# Patient Record
Sex: FEMALE | Race: WHITE | NOT HISPANIC OR LATINO | Employment: FULL TIME | URBAN - NONMETROPOLITAN AREA
[De-identification: names, ages, dates, MRNs, and addresses within clinical notes are randomized per-mention and may not be internally consistent; named-entity substitution may affect disease eponyms.]

---

## 2017-04-26 ENCOUNTER — TRANSCRIBE ORDERS (OUTPATIENT)
Dept: ADMINISTRATIVE | Facility: HOSPITAL | Age: 42
End: 2017-04-26

## 2017-04-26 DIAGNOSIS — R10.2 ADNEXAL TENDERNESS: Primary | ICD-10-CM

## 2017-04-26 DIAGNOSIS — Z12.31 VISIT FOR SCREENING MAMMOGRAM: Primary | ICD-10-CM

## 2017-05-01 ENCOUNTER — HOSPITAL ENCOUNTER (OUTPATIENT)
Dept: MAMMOGRAPHY | Facility: HOSPITAL | Age: 42
Discharge: HOME | End: 2017-05-01
Admitting: NURSE PRACTITIONER

## 2017-05-01 ENCOUNTER — HOSPITAL ENCOUNTER (OUTPATIENT)
Dept: ULTRASOUND IMAGING | Facility: HOSPITAL | Age: 42
Discharge: HOME | End: 2017-05-01

## 2017-05-01 DIAGNOSIS — Z12.31 VISIT FOR SCREENING MAMMOGRAM: ICD-10-CM

## 2017-05-01 DIAGNOSIS — R10.2 ADNEXAL TENDERNESS: ICD-10-CM

## 2017-05-01 PROCEDURE — G0202 SCR MAMMO BI INCL CAD: HCPCS

## 2017-05-01 PROCEDURE — 76830 TRANSVAGINAL US NON-OB: CPT

## 2017-05-01 PROCEDURE — 77063 BREAST TOMOSYNTHESIS BI: CPT | Performed by: RADIOLOGY

## 2017-05-01 PROCEDURE — 76830 TRANSVAGINAL US NON-OB: CPT | Performed by: RADIOLOGY

## 2017-05-01 PROCEDURE — 77067 SCR MAMMO BI INCL CAD: CPT | Performed by: RADIOLOGY

## 2017-05-01 PROCEDURE — 77063 BREAST TOMOSYNTHESIS BI: CPT

## 2017-09-05 ENCOUNTER — TRANSCRIBE ORDERS (OUTPATIENT)
Dept: ADMINISTRATIVE | Facility: HOSPITAL | Age: 42
End: 2017-09-05

## 2017-09-05 ENCOUNTER — APPOINTMENT (OUTPATIENT)
Dept: LAB | Facility: HOSPITAL | Age: 42
End: 2017-09-05

## 2017-09-05 DIAGNOSIS — R10.31 ABDOMINAL PAIN, RIGHT LOWER QUADRANT: Primary | ICD-10-CM

## 2017-09-05 LAB
ALBUMIN SERPL-MCNC: 3.9 G/DL (ref 3.5–5)
ALBUMIN/GLOB SERPL: 1.3 G/DL (ref 1.5–2.5)
ALP SERPL-CCNC: 73 U/L (ref 35–104)
ALT SERPL W P-5'-P-CCNC: 36 U/L (ref 10–36)
ANION GAP SERPL CALCULATED.3IONS-SCNC: 2 MMOL/L (ref 3.6–11.2)
AST SERPL-CCNC: 27 U/L (ref 10–30)
BASOPHILS # BLD AUTO: 0.03 10*3/MM3 (ref 0–0.3)
BASOPHILS NFR BLD AUTO: 0.4 % (ref 0–2)
BILIRUB SERPL-MCNC: 0.3 MG/DL (ref 0.2–1.8)
BUN BLD-MCNC: 12 MG/DL (ref 7–21)
BUN/CREAT SERPL: 12.8 (ref 7–25)
CALCIUM SPEC-SCNC: 9.1 MG/DL (ref 7.7–10)
CHLORIDE SERPL-SCNC: 106 MMOL/L (ref 99–112)
CHOLEST SERPL-MCNC: 181 MG/DL (ref 0–200)
CO2 SERPL-SCNC: 32 MMOL/L (ref 24.3–31.9)
CREAT BLD-MCNC: 0.94 MG/DL (ref 0.43–1.29)
DEPRECATED RDW RBC AUTO: 43.1 FL (ref 37–54)
EOSINOPHIL # BLD AUTO: 0.37 10*3/MM3 (ref 0–0.7)
EOSINOPHIL NFR BLD AUTO: 5.3 % (ref 0–5)
ERYTHROCYTE [DISTWIDTH] IN BLOOD BY AUTOMATED COUNT: 13.4 % (ref 11.5–14.5)
GFR SERPL CREATININE-BSD FRML MDRD: 65 ML/MIN/1.73
GLOBULIN UR ELPH-MCNC: 2.9 GM/DL
GLUCOSE BLD-MCNC: 86 MG/DL (ref 70–110)
HCT VFR BLD AUTO: 41.8 % (ref 37–47)
HDLC SERPL-MCNC: 56 MG/DL (ref 60–100)
HGB BLD-MCNC: 13.8 G/DL (ref 12–16)
IMM GRANULOCYTES # BLD: 0.01 10*3/MM3 (ref 0–0.03)
IMM GRANULOCYTES NFR BLD: 0.1 % (ref 0–0.5)
LDLC SERPL CALC-MCNC: 90 MG/DL (ref 0–100)
LDLC/HDLC SERPL: 1.61 {RATIO}
LYMPHOCYTES # BLD AUTO: 2.71 10*3/MM3 (ref 1–3)
LYMPHOCYTES NFR BLD AUTO: 38.7 % (ref 21–51)
MCH RBC QN AUTO: 29.6 PG (ref 27–33)
MCHC RBC AUTO-ENTMCNC: 33 G/DL (ref 33–37)
MCV RBC AUTO: 89.5 FL (ref 80–94)
MONOCYTES # BLD AUTO: 0.45 10*3/MM3 (ref 0.1–0.9)
MONOCYTES NFR BLD AUTO: 6.4 % (ref 0–10)
NEUTROPHILS # BLD AUTO: 3.43 10*3/MM3 (ref 1.4–6.5)
NEUTROPHILS NFR BLD AUTO: 49.1 % (ref 30–70)
OSMOLALITY SERPL CALC.SUM OF ELEC: 278.5 MOSM/KG (ref 273–305)
PLATELET # BLD AUTO: 258 10*3/MM3 (ref 130–400)
PMV BLD AUTO: 11.3 FL (ref 6–10)
POTASSIUM BLD-SCNC: 4 MMOL/L (ref 3.5–5.3)
PROT SERPL-MCNC: 6.8 G/DL (ref 6–8)
RBC # BLD AUTO: 4.67 10*6/MM3 (ref 4.2–5.4)
SODIUM BLD-SCNC: 140 MMOL/L (ref 135–153)
TRIGL SERPL-MCNC: 173 MG/DL (ref 0–150)
VLDLC SERPL-MCNC: 34.6 MG/DL
WBC NRBC COR # BLD: 7 10*3/MM3 (ref 4.5–12.5)

## 2017-09-05 PROCEDURE — 80061 LIPID PANEL: CPT | Performed by: NURSE PRACTITIONER

## 2017-09-05 PROCEDURE — 36415 COLL VENOUS BLD VENIPUNCTURE: CPT | Performed by: NURSE PRACTITIONER

## 2017-09-05 PROCEDURE — 85025 COMPLETE CBC W/AUTO DIFF WBC: CPT | Performed by: NURSE PRACTITIONER

## 2017-09-05 PROCEDURE — 80053 COMPREHEN METABOLIC PANEL: CPT | Performed by: NURSE PRACTITIONER

## 2018-05-23 ENCOUNTER — HOSPITAL ENCOUNTER (OUTPATIENT)
Dept: MAMMOGRAPHY | Facility: HOSPITAL | Age: 43
Discharge: HOME | End: 2018-05-23
Admitting: NURSE PRACTITIONER

## 2018-05-23 DIAGNOSIS — Z12.39 BREAST CANCER SCREENING: ICD-10-CM

## 2018-05-23 PROCEDURE — 77063 BREAST TOMOSYNTHESIS BI: CPT

## 2018-05-23 PROCEDURE — 77063 BREAST TOMOSYNTHESIS BI: CPT | Performed by: RADIOLOGY

## 2018-05-23 PROCEDURE — 77067 SCR MAMMO BI INCL CAD: CPT

## 2018-05-23 PROCEDURE — 77067 SCR MAMMO BI INCL CAD: CPT | Performed by: RADIOLOGY

## 2019-01-25 ENCOUNTER — TRANSCRIBE ORDERS (OUTPATIENT)
Dept: ADMINISTRATIVE | Facility: HOSPITAL | Age: 44
End: 2019-01-25

## 2019-01-25 DIAGNOSIS — J32.4 CHRONIC PANSINUSITIS: Primary | ICD-10-CM

## 2019-02-04 ENCOUNTER — HOSPITAL ENCOUNTER (OUTPATIENT)
Dept: CT IMAGING | Facility: HOSPITAL | Age: 44
Discharge: HOME | End: 2019-02-04
Admitting: NURSE PRACTITIONER

## 2019-02-04 DIAGNOSIS — J32.4 CHRONIC PANSINUSITIS: ICD-10-CM

## 2019-02-04 PROCEDURE — 70486 CT MAXILLOFACIAL W/O DYE: CPT

## 2019-02-04 PROCEDURE — 70486 CT MAXILLOFACIAL W/O DYE: CPT | Performed by: RADIOLOGY

## 2019-02-05 PROBLEM — J34.3 HYPERTROPHY OF NASAL TURBINATES: Status: ACTIVE | Noted: 2019-02-05

## 2019-02-05 PROBLEM — J30.1 ALLERGIC RHINITIS DUE TO POLLEN: Status: ACTIVE | Noted: 2019-02-05

## 2019-02-05 PROBLEM — J34.2 DEVIATED NASAL SEPTUM: Status: ACTIVE | Noted: 2019-02-05

## 2019-03-06 ENCOUNTER — APPOINTMENT (OUTPATIENT)
Dept: PREADMISSION TESTING | Facility: HOSPITAL | Age: 44
End: 2019-03-06

## 2019-03-06 LAB
ANION GAP SERPL CALCULATED.3IONS-SCNC: 8.5 MMOL/L (ref 3.6–11.2)
BUN BLD-MCNC: 16 MG/DL (ref 7–21)
BUN/CREAT SERPL: 14.7 (ref 7–25)
CALCIUM SPEC-SCNC: 9.2 MG/DL (ref 7.7–10)
CHLORIDE SERPL-SCNC: 104 MMOL/L (ref 99–112)
CO2 SERPL-SCNC: 25.5 MMOL/L (ref 24.3–31.9)
CREAT BLD-MCNC: 1.09 MG/DL (ref 0.43–1.29)
DEPRECATED RDW RBC AUTO: 41.2 FL (ref 37–54)
ERYTHROCYTE [DISTWIDTH] IN BLOOD BY AUTOMATED COUNT: 12.6 % (ref 11.5–14.5)
GFR SERPL CREATININE-BSD FRML MDRD: 54 ML/MIN/1.73
GLUCOSE BLD-MCNC: 84 MG/DL (ref 70–110)
HCT VFR BLD AUTO: 43.9 % (ref 37–47)
HGB BLD-MCNC: 14.4 G/DL (ref 12–16)
MCH RBC QN AUTO: 29.7 PG (ref 27–33)
MCHC RBC AUTO-ENTMCNC: 32.8 G/DL (ref 33–37)
MCV RBC AUTO: 90.5 FL (ref 80–94)
OSMOLALITY SERPL CALC.SUM OF ELEC: 276.1 MOSM/KG (ref 273–305)
PLATELET # BLD AUTO: 283 10*3/MM3 (ref 130–400)
PMV BLD AUTO: 11 FL (ref 6–10)
POTASSIUM BLD-SCNC: 4.2 MMOL/L (ref 3.5–5.3)
RBC # BLD AUTO: 4.85 10*6/MM3 (ref 4.2–5.4)
SODIUM BLD-SCNC: 138 MMOL/L (ref 135–153)
WBC NRBC COR # BLD: 9.72 10*3/MM3 (ref 4.5–12.5)

## 2019-03-06 PROCEDURE — 85027 COMPLETE CBC AUTOMATED: CPT | Performed by: ANESTHESIOLOGY

## 2019-03-06 PROCEDURE — 80048 BASIC METABOLIC PNL TOTAL CA: CPT | Performed by: ANESTHESIOLOGY

## 2019-03-06 PROCEDURE — 36415 COLL VENOUS BLD VENIPUNCTURE: CPT

## 2019-03-06 NOTE — DISCHARGE INSTRUCTIONS
0730---3/11/19        ARRIVAL TIME    TAKE the following medications the morning of surgery:  All heart or blood pressure medications    HOLD all diabetic medications the morning of surgery as ordered by physician.    Please discontinue all blood thinners and anticoagulants (except aspirin) prior to surgery as per your surgeon and cardiologist instructions.  Aspirin may be continued up to the day prior to surgery.     CHLORHEXIDINE CLOTHS GIVEN WITH INSTRUCTIONS AND FORM TO RETURN TO HOSPITAL    General Instructions:  · Do not eat or drink after midnight: includes water, mints, or gum. You may brush your teeth.  Dental appliances that are removable must be taken out day of surgery.  · Do not smoke, chew tobacco, or drink alcohol.  · Bring medications in original bottles, any inhalers and if applicable your C-PAP/BI-PAP machine.  · Bring any papers given to you in the doctor's office.  · Wear clean comfortable clothes and socks.  · Do not wear contact lenses or make-up. Bring a case for your glasses if applicable.  · Bring crutches or walker if applicable.  · Leave all other valuables and jewelry at home.    If you were given a blood bank ID arm band remember to bring it with you the day of surgery.    Preventing a Surgical Site Infection:  Shower the night before surgery (unless instructed other wise) using a fresh bar of anti-bacterial soap (such as Dial) and clean washcloth. Dry with a clean towel and dress in clean clothing.  For 2 to 3 days before surgery, avoid shaving with a razor near where you will have surgery because the razor can irritate skin and make it easier to develop an infection. Ask your surgeon if you will be receiving antibiotics prior to surgery.  Make sure you, your family, and all healthcare providers clear their hands with soap and water or an alcohol-based hand  before caring for you or your wound.  If at all possible, quit smoking as many days before surgery as you can.    Day of  surgery:  Upon arrival, a Pre-op nurse and Anesthesiologist will review your health history, obtain vital signs, and answer questions you may have. The only belongings needed at this time will be your home medications and if applicable your C-PAP/BI-PAP machine. If you are staying overnight your family can leave the rest of your belongings in the car and bring them to your room later. A Pre-op nurse will start an IV and you may receive medication in preparation for surgery, including something to help you relax. Your family will be able to see you in the Pre-op area. While you are in surgery your family should notify the waiting room  if they leave the waiting room area and provide a contact phone number.    Please be aware that surgery does come with discomfort. We want to make every effort to control your discomfort so please discuss any uncontrolled symptoms with your nurse. Your doctor will most likely have prescribed pain medications.    If you are going home after surgery you will receive individualized written care instructions before being discharged. A responsible adult must drive you to and from the hospital on the day of surgery and stay with you for 24 hours.    If you are staying overnight following surgery, you will be transported to your hospital room following the recovery period.  [facility] has all private rooms.    If you have any questions please call Pre-Admission Testing at [phone].  Deductibles and co-payments are collected on the day of service. Please be prepared to pay the required co-pay, deductible or deposit on the day of service as defined by your plan.

## 2019-03-11 ENCOUNTER — ANESTHESIA EVENT (OUTPATIENT)
Dept: PERIOP | Facility: HOSPITAL | Age: 44
End: 2019-03-11

## 2019-03-11 ENCOUNTER — HOSPITAL ENCOUNTER (OUTPATIENT)
Facility: HOSPITAL | Age: 44
Setting detail: HOSPITAL OUTPATIENT SURGERY
Discharge: HOME | End: 2019-03-11
Attending: OTOLARYNGOLOGY | Admitting: OTOLARYNGOLOGY

## 2019-03-11 ENCOUNTER — ANESTHESIA (OUTPATIENT)
Dept: PERIOP | Facility: HOSPITAL | Age: 44
End: 2019-03-11

## 2019-03-11 VITALS
DIASTOLIC BLOOD PRESSURE: 79 MMHG | BODY MASS INDEX: 40.85 KG/M2 | SYSTOLIC BLOOD PRESSURE: 132 MMHG | HEART RATE: 72 BPM | TEMPERATURE: 97.6 F | WEIGHT: 222 LBS | RESPIRATION RATE: 18 BRPM | HEIGHT: 62 IN | OXYGEN SATURATION: 97 %

## 2019-03-11 DIAGNOSIS — J34.3 HYPERTROPHY OF NASAL TURBINATES: ICD-10-CM

## 2019-03-11 DIAGNOSIS — J30.1 ALLERGIC RHINITIS DUE TO POLLEN: ICD-10-CM

## 2019-03-11 DIAGNOSIS — J34.2 DEVIATED NASAL SEPTUM: ICD-10-CM

## 2019-03-11 LAB
B-HCG UR QL: NEGATIVE
INTERNAL NEGATIVE CONTROL: NEGATIVE
INTERNAL POSITIVE CONTROL: POSITIVE
Lab: NORMAL

## 2019-03-11 PROCEDURE — 25010000002 NEOSTIGMINE 10 MG/10ML SOLUTION: Performed by: NURSE ANESTHETIST, CERTIFIED REGISTERED

## 2019-03-11 PROCEDURE — 81025 URINE PREGNANCY TEST: CPT | Performed by: OTOLARYNGOLOGY

## 2019-03-11 PROCEDURE — 25010000002 FENTANYL CITRATE (PF) 100 MCG/2ML SOLUTION: Performed by: NURSE ANESTHETIST, CERTIFIED REGISTERED

## 2019-03-11 PROCEDURE — 25010000002 MIDAZOLAM PER 1 MG: Performed by: NURSE ANESTHETIST, CERTIFIED REGISTERED

## 2019-03-11 PROCEDURE — 25010000002 ONDANSETRON PER 1 MG: Performed by: NURSE ANESTHETIST, CERTIFIED REGISTERED

## 2019-03-11 PROCEDURE — 25010000002 PROPOFOL 10 MG/ML EMULSION: Performed by: NURSE ANESTHETIST, CERTIFIED REGISTERED

## 2019-03-11 PROCEDURE — 25010000002 DEXAMETHASONE PER 1 MG: Performed by: NURSE ANESTHETIST, CERTIFIED REGISTERED

## 2019-03-11 RX ORDER — LIDOCAINE HYDROCHLORIDE AND EPINEPHRINE 10; 10 MG/ML; UG/ML
INJECTION, SOLUTION INFILTRATION; PERINEURAL AS NEEDED
Status: DISCONTINUED | OUTPATIENT
Start: 2019-03-11 | End: 2019-03-11 | Stop reason: HOSPADM

## 2019-03-11 RX ORDER — ONDANSETRON 4 MG/1
4 TABLET, FILM COATED ORAL EVERY 8 HOURS PRN
Qty: 12 TABLET | Refills: 0 | Status: SHIPPED | OUTPATIENT
Start: 2019-03-11 | End: 2022-09-08

## 2019-03-11 RX ORDER — FENTANYL CITRATE 50 UG/ML
50 INJECTION, SOLUTION INTRAMUSCULAR; INTRAVENOUS
Status: DISCONTINUED | OUTPATIENT
Start: 2019-03-11 | End: 2019-03-11 | Stop reason: HOSPADM

## 2019-03-11 RX ORDER — GLYCOPYRROLATE 0.2 MG/ML
INJECTION INTRAMUSCULAR; INTRAVENOUS AS NEEDED
Status: DISCONTINUED | OUTPATIENT
Start: 2019-03-11 | End: 2019-03-11 | Stop reason: SURG

## 2019-03-11 RX ORDER — NEOSTIGMINE METHYLSULFATE 1 MG/ML
INJECTION, SOLUTION INTRAVENOUS AS NEEDED
Status: DISCONTINUED | OUTPATIENT
Start: 2019-03-11 | End: 2019-03-11 | Stop reason: SURG

## 2019-03-11 RX ORDER — SODIUM CHLORIDE 0.9 % (FLUSH) 0.9 %
3 SYRINGE (ML) INJECTION EVERY 12 HOURS SCHEDULED
Status: DISCONTINUED | OUTPATIENT
Start: 2019-03-11 | End: 2019-03-11 | Stop reason: HOSPADM

## 2019-03-11 RX ORDER — MEPERIDINE HYDROCHLORIDE 25 MG/ML
12.5 INJECTION INTRAMUSCULAR; INTRAVENOUS; SUBCUTANEOUS
Status: DISCONTINUED | OUTPATIENT
Start: 2019-03-11 | End: 2019-03-11 | Stop reason: HOSPADM

## 2019-03-11 RX ORDER — IPRATROPIUM BROMIDE AND ALBUTEROL SULFATE 2.5; .5 MG/3ML; MG/3ML
3 SOLUTION RESPIRATORY (INHALATION) ONCE AS NEEDED
Status: DISCONTINUED | OUTPATIENT
Start: 2019-03-11 | End: 2019-03-11 | Stop reason: HOSPADM

## 2019-03-11 RX ORDER — ONDANSETRON 2 MG/ML
INJECTION INTRAMUSCULAR; INTRAVENOUS AS NEEDED
Status: DISCONTINUED | OUTPATIENT
Start: 2019-03-11 | End: 2019-03-11 | Stop reason: SURG

## 2019-03-11 RX ORDER — BACITRACIN, NEOMYCIN, POLYMYXIN B 400; 3.5; 5 [USP'U]/G; MG/G; [USP'U]/G
OINTMENT TOPICAL AS NEEDED
Status: DISCONTINUED | OUTPATIENT
Start: 2019-03-11 | End: 2019-03-11 | Stop reason: HOSPADM

## 2019-03-11 RX ORDER — ROCURONIUM BROMIDE 10 MG/ML
INJECTION, SOLUTION INTRAVENOUS AS NEEDED
Status: DISCONTINUED | OUTPATIENT
Start: 2019-03-11 | End: 2019-03-11 | Stop reason: SURG

## 2019-03-11 RX ORDER — PROPOFOL 10 MG/ML
VIAL (ML) INTRAVENOUS AS NEEDED
Status: DISCONTINUED | OUTPATIENT
Start: 2019-03-11 | End: 2019-03-11 | Stop reason: SURG

## 2019-03-11 RX ORDER — SODIUM CHLORIDE 0.9 % (FLUSH) 0.9 %
3-10 SYRINGE (ML) INJECTION AS NEEDED
Status: DISCONTINUED | OUTPATIENT
Start: 2019-03-11 | End: 2019-03-11 | Stop reason: HOSPADM

## 2019-03-11 RX ORDER — MIDAZOLAM HYDROCHLORIDE 1 MG/ML
INJECTION INTRAMUSCULAR; INTRAVENOUS AS NEEDED
Status: DISCONTINUED | OUTPATIENT
Start: 2019-03-11 | End: 2019-03-11 | Stop reason: SURG

## 2019-03-11 RX ORDER — FAMOTIDINE 10 MG/ML
INJECTION, SOLUTION INTRAVENOUS AS NEEDED
Status: DISCONTINUED | OUTPATIENT
Start: 2019-03-11 | End: 2019-03-11 | Stop reason: SURG

## 2019-03-11 RX ORDER — LIDOCAINE HYDROCHLORIDE 20 MG/ML
INJECTION, SOLUTION EPIDURAL; INFILTRATION; INTRACAUDAL; PERINEURAL AS NEEDED
Status: DISCONTINUED | OUTPATIENT
Start: 2019-03-11 | End: 2019-03-11 | Stop reason: SURG

## 2019-03-11 RX ORDER — FENTANYL CITRATE 50 UG/ML
INJECTION, SOLUTION INTRAMUSCULAR; INTRAVENOUS AS NEEDED
Status: DISCONTINUED | OUTPATIENT
Start: 2019-03-11 | End: 2019-03-11 | Stop reason: SURG

## 2019-03-11 RX ORDER — DEXAMETHASONE SODIUM PHOSPHATE 10 MG/ML
INJECTION INTRAMUSCULAR; INTRAVENOUS AS NEEDED
Status: DISCONTINUED | OUTPATIENT
Start: 2019-03-11 | End: 2019-03-11 | Stop reason: SURG

## 2019-03-11 RX ORDER — SODIUM CHLORIDE, SODIUM LACTATE, POTASSIUM CHLORIDE, CALCIUM CHLORIDE 600; 310; 30; 20 MG/100ML; MG/100ML; MG/100ML; MG/100ML
125 INJECTION, SOLUTION INTRAVENOUS CONTINUOUS
Status: DISCONTINUED | OUTPATIENT
Start: 2019-03-11 | End: 2019-03-11 | Stop reason: HOSPADM

## 2019-03-11 RX ORDER — ONDANSETRON 2 MG/ML
4 INJECTION INTRAMUSCULAR; INTRAVENOUS ONCE AS NEEDED
Status: DISCONTINUED | OUTPATIENT
Start: 2019-03-11 | End: 2019-03-11 | Stop reason: HOSPADM

## 2019-03-11 RX ORDER — OXYCODONE HYDROCHLORIDE AND ACETAMINOPHEN 5; 325 MG/1; MG/1
1 TABLET ORAL ONCE AS NEEDED
Status: DISCONTINUED | OUTPATIENT
Start: 2019-03-11 | End: 2019-03-11 | Stop reason: HOSPADM

## 2019-03-11 RX ORDER — SODIUM CHLORIDE 9 MG/ML
INJECTION, SOLUTION INTRAVENOUS AS NEEDED
Status: DISCONTINUED | OUTPATIENT
Start: 2019-03-11 | End: 2019-03-11 | Stop reason: HOSPADM

## 2019-03-11 RX ORDER — HYDROCODONE BITARTRATE AND ACETAMINOPHEN 7.5; 325 MG/1; MG/1
1 TABLET ORAL EVERY 6 HOURS PRN
Qty: 30 TABLET | Refills: 0 | Status: SHIPPED | OUTPATIENT
Start: 2019-03-11 | End: 2022-09-08

## 2019-03-11 RX ORDER — OXYMETAZOLINE HYDROCHLORIDE 0.05 G/100ML
2 SPRAY NASAL ONCE
Status: COMPLETED | OUTPATIENT
Start: 2019-03-11 | End: 2019-03-11

## 2019-03-11 RX ADMIN — FAMOTIDINE 20 MG: 10 INJECTION, SOLUTION INTRAVENOUS at 09:55

## 2019-03-11 RX ADMIN — OXYMETAZOLINE HYDROCHLORIDE 2 SPRAY: 0.05 SPRAY NASAL at 09:14

## 2019-03-11 RX ADMIN — ONDANSETRON 4 MG: 2 INJECTION, SOLUTION INTRAMUSCULAR; INTRAVENOUS at 09:55

## 2019-03-11 RX ADMIN — GLYCOPYRROLATE 0.8 MG: 0.2 INJECTION INTRAMUSCULAR; INTRAVENOUS at 10:21

## 2019-03-11 RX ADMIN — PROPOFOL 200 MG: 10 INJECTION, EMULSION INTRAVENOUS at 09:59

## 2019-03-11 RX ADMIN — DEXAMETHASONE SODIUM PHOSPHATE 20 MG: 10 INJECTION INTRAMUSCULAR; INTRAVENOUS at 10:06

## 2019-03-11 RX ADMIN — ROCURONIUM BROMIDE 30 MG: 10 INJECTION INTRAVENOUS at 09:59

## 2019-03-11 RX ADMIN — LIDOCAINE HYDROCHLORIDE 100 MG: 20 INJECTION, SOLUTION EPIDURAL; INFILTRATION; INTRACAUDAL; PERINEURAL at 09:59

## 2019-03-11 RX ADMIN — NEOSTIGMINE METHYLSULFATE 4 MG: 1 INJECTION, SOLUTION INTRAVENOUS at 10:21

## 2019-03-11 RX ADMIN — FENTANYL CITRATE 100 MCG: 50 INJECTION INTRAMUSCULAR; INTRAVENOUS at 09:55

## 2019-03-11 RX ADMIN — SODIUM CHLORIDE, POTASSIUM CHLORIDE, SODIUM LACTATE AND CALCIUM CHLORIDE: 600; 310; 30; 20 INJECTION, SOLUTION INTRAVENOUS at 09:55

## 2019-03-11 RX ADMIN — MIDAZOLAM HYDROCHLORIDE 2 MG: 1 INJECTION, SOLUTION INTRAMUSCULAR; INTRAVENOUS at 09:55

## 2019-03-11 NOTE — ANESTHESIA PROCEDURE NOTES
Airway  Urgency: elective    Date/Time: 3/11/2019 9:55 AM  Airway not difficult    General Information and Staff    Patient location during procedure: OR  Anesthesiologist: Hernán Sahu MD  CRNA: Reese Gray CRNA    Indications and Patient Condition  Indications for airway management: airway protection    Preoxygenated: yes  MILS not maintained throughout  Mask difficulty assessment: 0 - not attempted    Final Airway Details  Final airway type: endotracheal airway      Successful airway: ETT and PADMINI tube  Cuffed: yes   Successful intubation technique: direct laryngoscopy  Endotracheal tube insertion site: oral  Blade: Langston  Blade size: 2  ETT size (mm): 7.5  Cormack-Lehane Classification: grade I - full view of glottis  Placement verified by: chest auscultation and capnometry   Measured from: lips  ETT to lips (cm): 22  Number of attempts at approach: 1    Additional Comments  Atraumatic ETT placement, dentition unchanged.

## 2019-03-11 NOTE — ANESTHESIA POSTPROCEDURE EVALUATION
Patient: Malgorzata Jimenez    Procedure Summary     Date:  03/11/19 Room / Location:  [facility] OR 09 /  [facility] OR    Anesthesia Start:  0955 Anesthesia Stop:  1026    Procedure:  SEPTOPLASTY (Bilateral Nose) Diagnosis:       Deviated nasal septum      Allergic rhinitis due to pollen      Hypertrophy of nasal turbinates      (Deviated nasal septum [J34.2])      (Allergic rhinitis due to pollen [J30.1])      (Hypertrophy of nasal turbinates [J34.3])    Surgeon:  Leroy Thompson MD Provider:  Hernán Sahu MD    Anesthesia Type:  general ASA Status:  3          Anesthesia Type: general  Last vitals  BP   132/79 (03/11/19 1157)   Temp   97.6 °F (36.4 °C) (03/11/19 1103)   Pulse   72 (03/11/19 1157)   Resp   18 (03/11/19 1157)     SpO2   97 % (03/11/19 1157)     Post Anesthesia Care and Evaluation    Patient location during evaluation: PHASE II  Patient participation: complete - patient participated  Level of consciousness: awake and alert  Pain score: 1  Pain management: adequate  Airway patency: patent  Anesthetic complications: No anesthetic complications  PONV Status: controlled  Cardiovascular status: acceptable  Respiratory status: acceptable  Hydration status: acceptable

## 2019-03-11 NOTE — H&P
Chief complaint: F/U on AST   HPI: C/O Difficulty breathing through nose ; did wean off afrin with medrol ; Sinus CT large turbs ; DNS L       Review of Systems:  negative    History  Past Medical History:   Diagnosis Date   • Allergic rhinitis due to pollen    • Deviated septum      Past Surgical History:   Procedure Laterality Date   • GALLBLADDER SURGERY     • TUBAL COAGULATION LAPAROSCOPIC       Family History   Problem Relation Age of Onset   • Breast cancer Maternal Aunt      Social History     Tobacco Use   • Smoking status: Never Smoker   • Smokeless tobacco: Never Used   Substance Use Topics   • Alcohol use: No     Frequency: Never   • Drug use: No     Medications Prior to Admission   Medication Sig Dispense Refill Last Dose   • azelastine (ASTELIN) 0.1 % nasal spray Use 2 sprays in each nostril 3 times daily as needed. 30 mL 5 3/10/2019 at Unknown time   • citalopram (CeleXA) 10 MG tablet Take 1 tablet by mouth Every Night. 30 tablet 2 3/10/2019 at Unknown time   • citalopram (CeleXA) 10 MG tablet Take 1 tablet by mouth Every Night. 30 tablet 2 3/10/2019 at Unknown time   • fluticasone (FLONASE) 50 MCG/ACT nasal spray USE 2 SPRAYS IN EACH NOSTRIL DAILY. 16 g 5 3/11/2019 at Unknown time   • EPINEPHrine (EPIPEN) 0.3 MG/0.3ML solution auto-injector injection Use as direced. 2 each 3 Unknown at Unknown time   • GuaiFENesin ER 1200 MG tablet sustained-release 12 hour Take 1 tablet by mouth Daily to thin mucous and secretions. 30 each 3 More than a month at Unknown time     Allergies:  Penicillins    Objective     Vital Signs  Ht 5'2  Wt 227  Resp 22    Physical Exam:      General Appearance:    Alert, cooperative, in no acute distress   Head:    Normocephalic, without obvious abnormality, atraumatic   Eyes:            Lids and lashes normal, conjunctivae and sclerae normal, no   icterus, no pallor, corneas clear, PERRLA   Ears:    Ears appear intact with no abnormalities noted   Nose:   Throat:   DNS L     No  oral lesions, no thrush, oral mucosa moist   Neck:   No adenopathy, supple, trachea midline, no thyromegaly, no   carotid bruit, no JVD    Thyroid- normal    Salivary Glands- normal       Lungs:     Clear to auscultation,respirations regular, even and                  unlabored    Heart:    Regular rhythm and normal rate, normal S1 and S2, no            murmur, no gallop, no rub, no click                                                                  Assessment  Deviated nasal septum  Allergic rhinitis due to pollen  Hypertrophy of nasal turbinates        Plan  Septo with turbs reduction              Leroy Thompson MD  03/11/19  9:39 AM

## 2019-03-11 NOTE — OP NOTE
PROCEDURE: Septoplasty     3/11/2019    Pre-op Diagnosis:   Deviated nasal septum [J34.2]  Allergic rhinitis due to pollen [J30.1]  Hypertrophy of nasal turbinates [J34.3]    Post-op Diagnosis:     Post-Op Diagnosis Codes:     * Deviated nasal septum [J34.2]     * Allergic rhinitis due to pollen [J30.1]     * Hypertrophy of nasal turbinates [J34.3]    Procedure(s):  SEPTOPLASTY,     Surgeon(s):  Leroy Thompson MD    Surgeon: Leroy Thompson MD    Anesthesia: General    Staff:   Circulator: Zoe Ignacio RN; Paris Sanchez RN  Scrub Person: Kemar Vega     Procedure Details: Nose prepped and draped in sterile fashion septum injected with 7 cc of 1% lidocaine 1-100,000 epi.  Hemitransfixion incision made on the left and left-sided mucoperichondrial mucoperiosteal tunnel elevated.  Bony cartilaginous junction disarticulated and right mucoperiosteal tunnel elevated.  Bony deformity removed with Eduardo forceps.  Strip of cartilage was taken from the inferior crest allowing the cartilage to swing to the midline.  A transfixion incision closed with 4-0 chromic.  Inferior turbinates lateralized.  Macias splints placed bilateral.  To recovery room stable.    Grafts/Implants: None    Blood administered: None    Estimated Blood Loss: 5 ml    Drains:  None    Specimens: None     Complications: None    Disposition: PACU - hemodynamically stable.    Condition: stable    Leroy Thompson MD     Date: 3/11/2019  Time: 10:21 AM

## 2019-03-11 NOTE — ANESTHESIA PREPROCEDURE EVALUATION
Anesthesia Evaluation     Patient summary reviewed and Nursing notes reviewed   no history of anesthetic complications:  NPO Solid Status: > 8 hours  NPO Liquid Status: > 8 hours           Airway   Mallampati: III  TM distance: <3 FB  Neck ROM: full  Possible difficult intubation  Dental - normal exam     Pulmonary - negative pulmonary ROS and normal exam   (-) not a smoker  Cardiovascular - negative cardio ROS and normal exam        Neuro/Psych  (+) psychiatric history,     GI/Hepatic/Renal/Endo    (+) morbid obesity,      Musculoskeletal (-) negative ROS    Abdominal  - normal exam    Bowel sounds: normal.   Substance History - negative use     OB/GYN negative ob/gyn ROS         Other                        Anesthesia Plan    ASA 3     general     intravenous induction   Anesthetic plan, all risks, benefits, and alternatives have been provided, discussed and informed consent has been obtained with: patient.    Plan discussed with CRNA.

## 2019-05-27 ENCOUNTER — APPOINTMENT (OUTPATIENT)
Dept: MAMMOGRAPHY | Facility: HOSPITAL | Age: 44
End: 2019-05-27

## 2019-06-05 ENCOUNTER — HOSPITAL ENCOUNTER (OUTPATIENT)
Dept: MAMMOGRAPHY | Facility: HOSPITAL | Age: 44
Discharge: HOME | End: 2019-06-05
Admitting: NURSE PRACTITIONER

## 2019-06-05 DIAGNOSIS — Z12.39 SCREENING BREAST EXAMINATION: ICD-10-CM

## 2019-06-05 PROCEDURE — 77063 BREAST TOMOSYNTHESIS BI: CPT

## 2019-06-05 PROCEDURE — 77067 SCR MAMMO BI INCL CAD: CPT

## 2019-06-05 PROCEDURE — 77063 BREAST TOMOSYNTHESIS BI: CPT | Performed by: RADIOLOGY

## 2019-06-05 PROCEDURE — 77067 SCR MAMMO BI INCL CAD: CPT | Performed by: RADIOLOGY

## 2020-08-26 ENCOUNTER — HOSPITAL ENCOUNTER (OUTPATIENT)
Dept: MAMMOGRAPHY | Facility: HOSPITAL | Age: 45
Discharge: HOME | End: 2020-08-26
Admitting: NURSE PRACTITIONER

## 2020-08-26 DIAGNOSIS — Z12.31 VISIT FOR SCREENING MAMMOGRAM: ICD-10-CM

## 2020-08-26 PROCEDURE — 77067 SCR MAMMO BI INCL CAD: CPT

## 2020-08-26 PROCEDURE — 77063 BREAST TOMOSYNTHESIS BI: CPT | Performed by: RADIOLOGY

## 2020-08-26 PROCEDURE — 77063 BREAST TOMOSYNTHESIS BI: CPT

## 2020-08-26 PROCEDURE — 77067 SCR MAMMO BI INCL CAD: CPT | Performed by: RADIOLOGY

## 2020-12-31 ENCOUNTER — IMMUNIZATION (OUTPATIENT)
Dept: VACCINE CLINIC | Facility: HOSPITAL | Age: 45
End: 2020-12-31

## 2020-12-31 PROCEDURE — 91300 HC SARSCOV02 VAC 30MCG/0.3ML IM: CPT | Performed by: FAMILY MEDICINE

## 2020-12-31 PROCEDURE — 0001A: CPT | Performed by: FAMILY MEDICINE

## 2021-01-21 ENCOUNTER — IMMUNIZATION (OUTPATIENT)
Dept: VACCINE CLINIC | Facility: HOSPITAL | Age: 46
End: 2021-01-21

## 2021-01-21 PROCEDURE — 0002A: CPT | Performed by: FAMILY MEDICINE

## 2021-01-21 PROCEDURE — 91300 HC SARSCOV02 VAC 30MCG/0.3ML IM: CPT | Performed by: FAMILY MEDICINE

## 2021-06-29 ENCOUNTER — APPOINTMENT (OUTPATIENT)
Dept: GENERAL RADIOLOGY | Facility: HOSPITAL | Age: 46
End: 2021-06-29

## 2021-06-29 ENCOUNTER — HOSPITAL ENCOUNTER (EMERGENCY)
Facility: HOSPITAL | Age: 46
Discharge: HOME | End: 2021-06-30
Attending: EMERGENCY MEDICINE | Admitting: EMERGENCY MEDICINE

## 2021-06-29 ENCOUNTER — APPOINTMENT (OUTPATIENT)
Dept: CT IMAGING | Facility: HOSPITAL | Age: 46
End: 2021-06-29

## 2021-06-29 DIAGNOSIS — S89.91XA INJURY OF RIGHT KNEE, INITIAL ENCOUNTER: Primary | ICD-10-CM

## 2021-06-29 PROCEDURE — 99283 EMERGENCY DEPT VISIT LOW MDM: CPT

## 2021-06-29 PROCEDURE — 25010000002 KETOROLAC TROMETHAMINE PER 15 MG: Performed by: EMERGENCY MEDICINE

## 2021-06-29 PROCEDURE — 96372 THER/PROPH/DIAG INJ SC/IM: CPT

## 2021-06-29 PROCEDURE — 70450 CT HEAD/BRAIN W/O DYE: CPT

## 2021-06-29 PROCEDURE — 73562 X-RAY EXAM OF KNEE 3: CPT

## 2021-06-29 RX ORDER — HYDROCODONE BITARTRATE AND ACETAMINOPHEN 7.5; 325 MG/1; MG/1
1 TABLET ORAL ONCE
Status: COMPLETED | OUTPATIENT
Start: 2021-06-29 | End: 2021-06-29

## 2021-06-29 RX ORDER — KETOROLAC TROMETHAMINE 30 MG/ML
60 INJECTION, SOLUTION INTRAMUSCULAR; INTRAVENOUS ONCE
Status: COMPLETED | OUTPATIENT
Start: 2021-06-29 | End: 2021-06-29

## 2021-06-29 RX ADMIN — KETOROLAC TROMETHAMINE 60 MG: 60 INJECTION, SOLUTION INTRAMUSCULAR at 23:25

## 2021-06-29 RX ADMIN — HYDROCODONE BITARTRATE AND ACETAMINOPHEN 1 TABLET: 7.5; 325 TABLET ORAL at 23:25

## 2021-06-30 VITALS
SYSTOLIC BLOOD PRESSURE: 132 MMHG | HEIGHT: 62 IN | DIASTOLIC BLOOD PRESSURE: 60 MMHG | BODY MASS INDEX: 40.48 KG/M2 | OXYGEN SATURATION: 98 % | TEMPERATURE: 98 F | RESPIRATION RATE: 18 BRPM | WEIGHT: 220 LBS | HEART RATE: 70 BPM

## 2021-06-30 RX ORDER — DICLOFENAC SODIUM 75 MG/1
75 TABLET, DELAYED RELEASE ORAL 2 TIMES DAILY
Qty: 30 TABLET | Refills: 0 | Status: SHIPPED | OUTPATIENT
Start: 2021-06-30 | End: 2022-09-08

## 2021-06-30 RX ORDER — HYDROCODONE BITARTRATE AND ACETAMINOPHEN 7.5; 325 MG/1; MG/1
1 TABLET ORAL EVERY 6 HOURS PRN
Qty: 12 TABLET | Refills: 0 | Status: SHIPPED | OUTPATIENT
Start: 2021-06-30 | End: 2022-09-08

## 2021-06-30 RX ORDER — TIZANIDINE 4 MG/1
4 TABLET ORAL EVERY 8 HOURS PRN
Qty: 30 TABLET | Refills: 0 | Status: SHIPPED | OUTPATIENT
Start: 2021-06-30 | End: 2022-09-08

## 2021-06-30 RX ORDER — HYDROCODONE BITARTRATE AND ACETAMINOPHEN 7.5; 325 MG/1; MG/1
1 TABLET ORAL EVERY 8 HOURS PRN
Status: DISCONTINUED | OUTPATIENT
Start: 2021-06-30 | End: 2021-06-30 | Stop reason: HOSPADM

## 2021-06-30 RX ADMIN — HYDROCODONE BITARTRATE AND ACETAMINOPHEN 1 TABLET: 7.5; 325 TABLET ORAL at 00:42

## 2021-07-20 ENCOUNTER — TRANSCRIBE ORDERS (OUTPATIENT)
Dept: ADMINISTRATIVE | Facility: HOSPITAL | Age: 46
End: 2021-07-20

## 2021-07-20 DIAGNOSIS — S83.511A COMPLETE TEAR OF RIGHT ACL, INITIAL ENCOUNTER: Primary | ICD-10-CM

## 2021-07-30 ENCOUNTER — HOSPITAL ENCOUNTER (OUTPATIENT)
Dept: MRI IMAGING | Facility: HOSPITAL | Age: 46
Discharge: HOME | End: 2021-07-30
Admitting: NURSE PRACTITIONER

## 2021-07-30 DIAGNOSIS — S83.511A COMPLETE TEAR OF RIGHT ACL, INITIAL ENCOUNTER: ICD-10-CM

## 2021-07-30 PROCEDURE — 73721 MRI JNT OF LWR EXTRE W/O DYE: CPT

## 2021-07-30 PROCEDURE — 73721 MRI JNT OF LWR EXTRE W/O DYE: CPT | Performed by: RADIOLOGY

## 2021-08-12 ENCOUNTER — TREATMENT (OUTPATIENT)
Dept: PHYSICAL THERAPY | Facility: CLINIC | Age: 46
End: 2021-08-12

## 2021-08-12 DIAGNOSIS — S83.511D RUPTURE OF ANTERIOR CRUCIATE LIGAMENT OF RIGHT KNEE, SUBSEQUENT ENCOUNTER: ICD-10-CM

## 2021-08-12 DIAGNOSIS — M25.561 ACUTE PAIN OF RIGHT KNEE: Primary | ICD-10-CM

## 2021-08-12 PROCEDURE — 97162 PT EVAL MOD COMPLEX 30 MIN: CPT | Performed by: PHYSICAL THERAPIST

## 2021-08-12 PROCEDURE — 97110 THERAPEUTIC EXERCISES: CPT | Performed by: PHYSICAL THERAPIST

## 2021-08-12 NOTE — PROGRESS NOTES
Physical Therapy Initial Evaluation and Plan of Care        Patient: Malgorzata Jimenez   : 1975  Diagnosis/ICD-10 Code:  Acute pain of right knee [M25.561]  Referring practitioner: PREM Hale  Date of Initial Visit: 2021  Today's Date: 2021  Patient seen for 1 sessions    Visit Diagnoses:    ICD-10-CM ICD-9-CM   1. Acute pain of right knee  M25.561 719.46   2. Rupture of anterior cruciate ligament of right knee, subsequent encounter  S83.511D V58.89     844.2              Subjective Questionnaire: LEFS: 21%      Subjective Evaluation    History of Present Illness  Date of onset: 2021  Mechanism of injury: Pt reports while stepping off of a ladder at home she strained her right knee and her knee seemed to dislocate.  The patient had increased pain and she was unable to weight bear on the right leg and she was taken to an ER.  The patient was suspected to have an ACL tear. One month later, the patient received a MRI that demonstrated an ACL tear.  The patient was referred to an orthopedic MD and was advised to receive therapy initially and return in six weeks.  The patient has now been referred to therapy for improved mobility and function.      Patient Occupation: CNA Quality of life: good    Pain  Current pain ratin  At best pain ratin  At worst pain ratin  Location: right knee  Quality: sharp and knife-like  Relieving factors: ice, medications, rest, support, change in position and heat  Aggravating factors: movement, stairs, standing, ambulation and squatting  Progression: no change    Hand dominance: right    Diagnostic Tests  MRI studies: abnormal (acl tear)    Patient Goals  Patient goals for therapy: decreased edema, decreased pain, improved balance, increased motion, increased strength, independence with ADLs/IADLs, return to sport/leisure activities and return to work             Objective          Observations     Additional Knee Observation  Details  Inferior medial right knee edema  No heat noted    Tenderness     Right Knee   Tenderness in the lateral joint line, medial joint line and patellar tendon.     Neurological Testing     Sensation     Knee   Left Knee   Intact: light touch    Right Knee   Intact: light touch     Active Range of Motion   Left Knee   Flexion: 134 degrees   Extension: 0 degrees     Additional Active Range of Motion Details  Right knee 14-50    Patellar Mobility   Left Knee Patellar tendons within functional limits include the medial, lateral, superior and inferior.     Right Knee Hypermobile in the medial, lateral and inferior patellar tendon(s).     Strength/Myotome Testing     Left Knee   Flexion: 4+  Extension: 4+    Additional Strength Details  Right knee NT due to guarding    Tests     Right Knee   Positive anterior drawer.     Additional Tests Details  Knee testing difficult due to guarding    Ambulation     Comments   Pt amb with antalgic gait with decresaed stance on right          Assessment & Plan     Assessment  Impairments: abnormal gait, abnormal muscle firing, abnormal or restricted ROM, activity intolerance, impaired physical strength, lacks appropriate home exercise program, pain with function and weight-bearing intolerance  Assessment details: Pt is a 44 y/o female referred to therapy for treatment of a right ACL tear.  Pt presents with decreased knee ROM, impaired gait and knee weakness.  Therapy will follow for improved knee stability and decreased pain.  Prognosis: good  Functional Limitations: walking, uncomfortable because of pain, standing and unable to perform repetitive tasks  Goals  Plan Goals: STG 4 weeks    1 Pt will be instructed in a HEP.  2 Pt will report pain no greater than 4/10 with gait.  3 Pt will improve her right knee flexion to 100 degrees to assist in sitting in her car.    LTG 8 weeks    1 Pt will demonstrate 4/5 right knee strength to reduce falls at home.  2 Pt will improve her LEFS to  less than 10% disability.  3 Pt will demonstrate improved gait mechanics with wt shifting and stride length.    Plan  Therapy options: will be seen for skilled physical therapy services  Planned modality interventions: cryotherapy, dry needling, electrical stimulation/Russian stimulation, TENS, thermotherapy (hydrocollator packs) and ultrasound  Planned therapy interventions: ADL retraining, balance/weight-bearing training, body mechanics training, fine motor coordination training, flexibility, functional ROM exercises, gait training, home exercise program, IADL retraining, joint mobilization, manual therapy, neuromuscular re-education, strengthening, stretching and therapeutic activities  Duration in visits: 16  Duration in weeks: 8  Treatment plan discussed with: patient  Plan details: Will follow for optimal gains.  Moderate Evaluation  26333  Re-evaluation   12291  Therapeutic exercise  49904  Therapeutic activity    92502  Neuromuscular re-education   67354  Manual therapy   66366  Gait training  28376  Attended e-stim  29327  Unattended e-stim (Private)  06334  Moist heat/cryotherapy 93979   Ultrasound   39127  Mechanical traction 08426          Visit Diagnoses:    ICD-10-CM ICD-9-CM   1. Acute pain of right knee  M25.561 719.46   2. Rupture of anterior cruciate ligament of right knee, subsequent encounter  S83.511D V58.89     844.2       Timed:  Manual Therapy:         mins  07437;  Therapeutic Exercise:    10     mins  31618;     Neuromuscular Narda:        mins  68652;    Therapeutic Activity:          mins  33262;     Gait Training:           mins  07747;     Ultrasound:          mins  08204;    Electrical Stimulation:         mins  86983 ( );    Untimed:  Electrical Stimulation:         mins  86371 ( );  Mechanical Traction:         mins  02923;     Timed Treatment:   10   mins   Total Treatment:     50   mins    PT SIGNATURE: Pancho Milligan, PT         Initial  Certification  Certification Period: 8/12/2021 thru 11/10/2021  I certify that the therapy services are furnished while this patient is under my care.  The services outlined above are required by this patient, and will be reviewed every 90 days.     PHYSICIAN: Ben Daniel APRN      DATE:     Please sign and return via fax to [email or web address] . Thank you, [facility].

## 2021-08-17 ENCOUNTER — TREATMENT (OUTPATIENT)
Dept: PHYSICAL THERAPY | Facility: CLINIC | Age: 46
End: 2021-08-17

## 2021-08-17 ENCOUNTER — TRANSCRIBE ORDERS (OUTPATIENT)
Dept: ADMINISTRATIVE | Facility: HOSPITAL | Age: 46
End: 2021-08-17

## 2021-08-17 DIAGNOSIS — Z11.59 SPECIAL SCREENING EXAMINATION FOR VIRAL DISEASE: Primary | ICD-10-CM

## 2021-08-17 DIAGNOSIS — M25.561 ACUTE PAIN OF RIGHT KNEE: Primary | ICD-10-CM

## 2021-08-17 DIAGNOSIS — S83.511D RUPTURE OF ANTERIOR CRUCIATE LIGAMENT OF RIGHT KNEE, SUBSEQUENT ENCOUNTER: ICD-10-CM

## 2021-08-17 PROCEDURE — 97014 ELECTRIC STIMULATION THERAPY: CPT | Performed by: PHYSICAL THERAPIST

## 2021-08-17 PROCEDURE — 97140 MANUAL THERAPY 1/> REGIONS: CPT | Performed by: PHYSICAL THERAPIST

## 2021-08-17 PROCEDURE — 97110 THERAPEUTIC EXERCISES: CPT | Performed by: PHYSICAL THERAPIST

## 2021-08-17 NOTE — PROGRESS NOTES
Physical Therapy Daily Treatment Note      Patient: Malgorzata Jimenez   : 1975  Referring practitioner: PREM Hale  Date of Initial Visit: Type: THERAPY  Noted: 2021  Today's Date: 2021  Patient seen for 2 sessions         Subjective:  Patient arrives to therapy w/ reports of 4/10 right knee pain.  Pt verbalizes compliance of initiating home program w/ decreased tightness following.     Objective   See Exercise, Manual, and Modality Logs for complete treatment.       Assessment/Plan:  Patient completed today's session w/ no complaints of pain increase following.  Pt received manual retrograde/ STM to right knee to assist w/ improved mobility and reduced edema f/b therex as listed, and conclusion of modalities.  Therex performed for improved right knee range of motion, improved right LE strength, and stability, as tolerated.  Pt provided w/ cues and demonstration for proper form and for max benefit.  Cryotherapy w/ Estim applied at conclusion.  Pt continues to benefit from therapy services to address goals, restore function, and reduce pain.  Continue w/ PT's POC.     Visit Diagnoses:    ICD-10-CM ICD-9-CM   1. Acute pain of right knee  M25.561 719.46   2. Rupture of anterior cruciate ligament of right knee, subsequent encounter  S83.511D V58.89     844.2       Progress per Plan of Care and Progress strengthening /stabilization /functional activity           Timed:  Manual Therapy:    11     mins  15048;  Therapeutic Exercise:    34     mins  58359;     Neuromuscular Narda:        mins  54007;    Therapeutic Activity:          mins  69939;     Gait Training:           mins  80542;     Ultrasound:          mins  97356;    Electrical Stimulation:         mins  49762 ( );    Untimed:  Electrical Stimulation:    10     mins  92695 ( );  Mechanical Traction:         mins  24872;     Timed Treatment:  45    mins   Total Treatment:    55    mins  Fauzia Henderson. Rashida  PTA  Physical Therapist

## 2021-08-19 ENCOUNTER — TREATMENT (OUTPATIENT)
Dept: PHYSICAL THERAPY | Facility: CLINIC | Age: 46
End: 2021-08-19

## 2021-08-19 DIAGNOSIS — S83.511D RUPTURE OF ANTERIOR CRUCIATE LIGAMENT OF RIGHT KNEE, SUBSEQUENT ENCOUNTER: ICD-10-CM

## 2021-08-19 DIAGNOSIS — M25.561 ACUTE PAIN OF RIGHT KNEE: Primary | ICD-10-CM

## 2021-08-19 PROCEDURE — 97035 APP MDLTY 1+ULTRASOUND EA 15: CPT | Performed by: PHYSICAL THERAPIST

## 2021-08-19 PROCEDURE — 97140 MANUAL THERAPY 1/> REGIONS: CPT | Performed by: PHYSICAL THERAPIST

## 2021-08-19 PROCEDURE — 97110 THERAPEUTIC EXERCISES: CPT | Performed by: PHYSICAL THERAPIST

## 2021-08-19 PROCEDURE — 97014 ELECTRIC STIMULATION THERAPY: CPT | Performed by: PHYSICAL THERAPIST

## 2021-08-19 NOTE — PROGRESS NOTES
Physical Therapy Daily Treatment Note      Patient: Malgorzata Jimenez   : 1975  Referring practitioner: PREM Hale  Date of Initial Visit: Type: THERAPY  Noted: 2021  Today's Date: 2021  Patient seen for 3 sessions         Malgorzata Jimenez reports that she is having soreness and swelling on the inside of her right knee. Patient states that she is working on her home exercises. Patient reports that she goes back to work next Thursday. Patient states that she has been massaging her right knee to help with swelling in the area.      Objective   See Exercise, Manual, and Modality Logs for complete treatment.       Assessment/Plan  Patient tolerated treatment session well with rest breaks taken as needed by the patient. Educated patient to perform therex per her tolerance, patient verbalized understanding. New seated therex added to treatment session, patient demonstrated and understood new therex with no increase in pain noted. No adverse reactions with modalities or treatment session. Ultrasound added to session to help improve swelling on the medial portion of the right knee. Ice and estim applied to the right knee post treatment session. No pain noted following treatment session. Continue per PT's POC, progress exercises per the patient's tolerance.    Visit Diagnoses:    ICD-10-CM ICD-9-CM   1. Acute pain of right knee  M25.561 719.46   2. Rupture of anterior cruciate ligament of right knee, subsequent encounter  S83.511D V58.89     844.2       Progress strengthening /stabilization /functional activity           Timed:  Manual Therapy:    11     mins  29547;  Therapeutic Exercise:     34    mins  39674;     Neuromuscular Narda:        mins  92770;    Therapeutic Activity:          mins  06673;     Gait Training:           mins  32420;     Ultrasound:     8     mins  00824;    Electrical Stimulation:         mins  64150 ( );    Untimed:  Electrical Stimulation:    10     mins   81086 ( );  Mechanical Traction:         mins  76930;     Timed Treatment:   53   mins   Total Treatment:     63   mins  Nora Luna, PTA

## 2021-08-20 ENCOUNTER — LAB (OUTPATIENT)
Dept: LAB | Facility: HOSPITAL | Age: 46
End: 2021-08-20

## 2021-08-20 DIAGNOSIS — Z11.59 SPECIAL SCREENING EXAMINATION FOR VIRAL DISEASE: ICD-10-CM

## 2021-08-20 LAB — SARS-COV-2 RNA PNL SPEC NAA+PROBE: NOT DETECTED

## 2021-08-20 PROCEDURE — U0004 COV-19 TEST NON-CDC HGH THRU: HCPCS | Performed by: INTERNAL MEDICINE

## 2021-08-20 PROCEDURE — C9803 HOPD COVID-19 SPEC COLLECT: HCPCS

## 2021-08-23 ENCOUNTER — TREATMENT (OUTPATIENT)
Dept: PHYSICAL THERAPY | Facility: CLINIC | Age: 46
End: 2021-08-23

## 2021-08-23 DIAGNOSIS — S83.511D RUPTURE OF ANTERIOR CRUCIATE LIGAMENT OF RIGHT KNEE, SUBSEQUENT ENCOUNTER: ICD-10-CM

## 2021-08-23 DIAGNOSIS — M25.561 ACUTE PAIN OF RIGHT KNEE: Primary | ICD-10-CM

## 2021-08-23 PROCEDURE — 97110 THERAPEUTIC EXERCISES: CPT | Performed by: PHYSICAL THERAPIST

## 2021-08-23 PROCEDURE — 97140 MANUAL THERAPY 1/> REGIONS: CPT | Performed by: PHYSICAL THERAPIST

## 2021-08-23 PROCEDURE — 97035 APP MDLTY 1+ULTRASOUND EA 15: CPT | Performed by: PHYSICAL THERAPIST

## 2021-08-23 NOTE — PROGRESS NOTES
"   Physical Therapy Daily Treatment Note      Patient: Malgorzata Jimenez   : 1975  Referring practitioner: PREM Hale  Date of Initial Visit: Type: THERAPY  Noted: 2021  Today's Date: 2021  Patient seen for 4 sessions         Subjective:  Patient arrives to therapy w/ reports of 3/10 right knee pain.  Pt states her knee just feels \"stiff\" today. Pt reports she is scheduled to return to work on Thursday.     Objective          Active Range of Motion     Right Knee   Flexion: 78 (pt in supine position) degrees       See Exercise, Manual, and Modality Logs for complete treatment.       Assessment/Plan:  Patient completed today's session w/ reports of slight increase in soreness following.  No signs of distress observed.  Pt received manual STM/ retrograde to R) knee to assist w/ reduced edema and improved mobility f/b therex as listed, pulsed US and conclusion of cryotherapy.  Therex progressed w/ standing heel raises initiated and gentle half revolutions on LE bike began to assist w/ improved flexion.  Pt observed w/ good tolerance, and was prov ided w/ cues and demonstration for proper form, and for max benefit.  Pt continues to benefit from therapy services and will be progressed as tolerated.  Continue w/ PT's POC.   5/10 post tx.    Visit Diagnoses:    ICD-10-CM ICD-9-CM   1. Acute pain of right knee  M25.561 719.46   2. Rupture of anterior cruciate ligament of right knee, subsequent encounter  S83.511D V58.89     844.2       Progress per Plan of Care and Progress strengthening /stabilization /functional activity           Timed:  Manual Therapy:    10     mins  96112;  Therapeutic Exercise:    39     mins  61366;     Neuromuscular Narda:        mins  28841;    Therapeutic Activity:          mins  30815;     Gait Training:           mins  86957;     Ultrasound:     8     mins  94074;    Electrical Stimulation:         mins  52983 ( );    Untimed:  Electrical Stimulation:        "  mins  33772 ( );  Mechanical Traction:         mins  20033;     Timed Treatment:   57   mins   Total Treatment:    62    mins  Fauzia Henderson. TONNY Field  Physical Therapist

## 2021-08-25 ENCOUNTER — TREATMENT (OUTPATIENT)
Dept: PHYSICAL THERAPY | Facility: CLINIC | Age: 46
End: 2021-08-25

## 2021-08-25 DIAGNOSIS — M25.561 ACUTE PAIN OF RIGHT KNEE: Primary | ICD-10-CM

## 2021-08-25 DIAGNOSIS — S83.511D RUPTURE OF ANTERIOR CRUCIATE LIGAMENT OF RIGHT KNEE, SUBSEQUENT ENCOUNTER: ICD-10-CM

## 2021-08-25 PROCEDURE — 97110 THERAPEUTIC EXERCISES: CPT | Performed by: PHYSICAL THERAPIST

## 2021-08-25 PROCEDURE — 97140 MANUAL THERAPY 1/> REGIONS: CPT | Performed by: PHYSICAL THERAPIST

## 2021-08-25 PROCEDURE — 97035 APP MDLTY 1+ULTRASOUND EA 15: CPT | Performed by: PHYSICAL THERAPIST

## 2021-08-25 NOTE — PROGRESS NOTES
Physical Therapy Daily Treatment Note      Patient: Malgorzata Jimenez   : 1975  Referring practitioner: PREM Hale  Date of Initial Visit: Type: THERAPY  Noted: 2021  Today's Date: 2021  Patient seen for 5 sessions         Malgorzata Jimenez reports of having soreness in her right knee. Patient states that she got a brace for her right knee and it is helping her knee. Patient reports of tightness on the inside of her right knee. Patient states that she returns to work tomorrow, 2021. Patient reports that she has irritation in right knee at times and her knee is sore after.      Objective   See Exercise, Manual, and Modality Logs for complete treatment.       Assessment/Plan  Theraband resistance increased with seated hip abduction. Weight added with seated march with no increase in pain noted. Time increased on LE bike with no increase in pain noted. Patient tolerated treatment session good with rest breaks taken as needed by the patient. STM performed prior to treatment session to help decrease swelling in the area. During LE bike half revolutions patient felt a discomfort in her right knee when bending it to take a half revolution; patient wanted to continue with LE bike. Patient felt discomfort and soreness following LE bike. Supervising PT, Elena Matamoros, PT, DPT assessed the right knee; no irritation, bruising, or tenderness noted. PT states to PTA to continue with session and perform modalities post session. Ultrasound and ice performed post treatment session. Decrease in pain noted following treatment session. Continue per PT's POC, progress per the patient's tolerance.     Visit Diagnoses:    ICD-10-CM ICD-9-CM   1. Acute pain of right knee  M25.561 719.46   2. Rupture of anterior cruciate ligament of right knee, subsequent encounter  S83.511D V58.89     844.2       Progress per Plan of Care and Progress strengthening /stabilization /functional activity            Timed:  Manual Therapy:    13     mins  35362;  Therapeutic Exercise:    39     mins  25971;     Neuromuscular Narda:        mins  24397;    Therapeutic Activity:          mins  56873;     Gait Training:           mins  28393;     Ultrasound:     8     mins  15916;    Electrical Stimulation:         mins  92540 ( );    Untimed:  Electrical Stimulation:         mins  80388 ( );  Mechanical Traction:         mins  70266;     Timed Treatment:   60   mins   Total Treatment:     60   mins  Nora Luna, PTA

## 2021-08-31 ENCOUNTER — TREATMENT (OUTPATIENT)
Dept: PHYSICAL THERAPY | Facility: CLINIC | Age: 46
End: 2021-08-31

## 2021-08-31 DIAGNOSIS — M25.561 ACUTE PAIN OF RIGHT KNEE: Primary | ICD-10-CM

## 2021-08-31 DIAGNOSIS — S83.511D RUPTURE OF ANTERIOR CRUCIATE LIGAMENT OF RIGHT KNEE, SUBSEQUENT ENCOUNTER: ICD-10-CM

## 2021-08-31 PROCEDURE — 97140 MANUAL THERAPY 1/> REGIONS: CPT | Performed by: PHYSICAL THERAPIST

## 2021-08-31 PROCEDURE — 97014 ELECTRIC STIMULATION THERAPY: CPT | Performed by: PHYSICAL THERAPIST

## 2021-08-31 PROCEDURE — 97110 THERAPEUTIC EXERCISES: CPT | Performed by: PHYSICAL THERAPIST

## 2021-08-31 NOTE — PROGRESS NOTES
Physical Therapy Daily Treatment Note      Patient: Malgorzata Jimenez   : 1975  Referring practitioner: PREM Hale  Date of Initial Visit: Type: THERAPY  Noted: 2021  Today's Date: 2021  Patient seen for 6 sessions         Subjective:  Patient reports 4/10 right knee pain prior to tx.  Pt states they worked her 4 days straight, and she had to stay in the bed most of the day yesterday.      Objective   See Exercise, Manual, and Modality Logs for complete treatment.       Assessment/Plan:  Patient completed today's session w/ reports of slight increase in soreness following, 6/10.  Pt reported no complaints during tx.  Pt performed therex as listed for improved right knee ROM, improved quad/ LE strength, and stability as tolerated.  Exercise progressed w/ additional standing LE strengthening activities added to program and TG squats initiated.  Pt provided w/ cues and demonstration w/ new added therex for proper form, and for max benefit.  Pt continues to ambulate w/ knee flexed, and antalgic gait.  Pt encouraged on importance of performing home program for max benefit w/ pt verbalizing understanding.  Cryotherapy w/ Estim applied at conclusion.  Pt continues to benefit from therapy services and will be progressed as tolerated.  Continue w/ PT's POC.          Visit Diagnoses:    ICD-10-CM ICD-9-CM   1. Acute pain of right knee  M25.561 719.46   2. Rupture of anterior cruciate ligament of right knee, subsequent encounter  S83.511D V58.89     844.2       Progress per Plan of Care and Progress strengthening /stabilization /functional activity           Timed:  Manual Therapy:    11     mins  69720;  Therapeutic Exercise:    36     mins  90339;     Neuromuscular Narda:        mins  47207;    Therapeutic Activity:          mins  08973;     Gait Training:           mins  39548;     Ultrasound:          mins  24198;    Electrical Stimulation:         mins  36697 (  );    Untimed:  Electrical Stimulation:    10     mins  43264 ( );  Mechanical Traction:         mins  49917;     Timed Treatment:  47    mins   Total Treatment:   57     mins  Fauzia Henderson. TONNY Field  Physical Therapist

## 2021-09-02 ENCOUNTER — TREATMENT (OUTPATIENT)
Dept: PHYSICAL THERAPY | Facility: CLINIC | Age: 46
End: 2021-09-02

## 2021-09-02 DIAGNOSIS — M25.561 ACUTE PAIN OF RIGHT KNEE: Primary | ICD-10-CM

## 2021-09-02 DIAGNOSIS — S83.511D RUPTURE OF ANTERIOR CRUCIATE LIGAMENT OF RIGHT KNEE, SUBSEQUENT ENCOUNTER: ICD-10-CM

## 2021-09-02 PROCEDURE — 97140 MANUAL THERAPY 1/> REGIONS: CPT | Performed by: PHYSICAL THERAPIST

## 2021-09-02 PROCEDURE — 97110 THERAPEUTIC EXERCISES: CPT | Performed by: PHYSICAL THERAPIST

## 2021-09-02 PROCEDURE — 97014 ELECTRIC STIMULATION THERAPY: CPT | Performed by: PHYSICAL THERAPIST

## 2021-09-09 PROBLEM — U07.1 COVID-19: Status: ACTIVE | Noted: 2021-09-09

## 2021-09-09 RX ORDER — METHYLPREDNISOLONE SODIUM SUCCINATE 125 MG/2ML
125 INJECTION, POWDER, LYOPHILIZED, FOR SOLUTION INTRAMUSCULAR; INTRAVENOUS AS NEEDED
Status: CANCELLED | OUTPATIENT
Start: 2021-09-10

## 2021-09-09 RX ORDER — DIPHENHYDRAMINE HYDROCHLORIDE 50 MG/ML
50 INJECTION INTRAMUSCULAR; INTRAVENOUS AS NEEDED
Status: CANCELLED | OUTPATIENT
Start: 2021-09-10

## 2021-09-09 RX ORDER — EPINEPHRINE 1 MG/ML
0.3 INJECTION, SOLUTION INTRAMUSCULAR; SUBCUTANEOUS AS NEEDED
Status: CANCELLED | OUTPATIENT
Start: 2021-09-10

## 2021-09-10 ENCOUNTER — HOSPITAL ENCOUNTER (OUTPATIENT)
Dept: INFUSION THERAPY | Facility: HOSPITAL | Age: 46
Discharge: HOME | End: 2021-09-10
Admitting: NURSE PRACTITIONER

## 2021-09-10 VITALS
HEART RATE: 71 BPM | OXYGEN SATURATION: 97 % | RESPIRATION RATE: 18 BRPM | DIASTOLIC BLOOD PRESSURE: 74 MMHG | SYSTOLIC BLOOD PRESSURE: 119 MMHG | TEMPERATURE: 97.1 F

## 2021-09-10 DIAGNOSIS — U07.1 COVID-19: Primary | ICD-10-CM

## 2021-09-10 PROCEDURE — 25010000006 INJECTION, CASIRIVIMAB AND IMDEVIMAB, 1200 MG: Performed by: NURSE PRACTITIONER

## 2021-09-10 PROCEDURE — M0243 CASIRIVI AND IMDEVI INFUSION: HCPCS | Performed by: NURSE PRACTITIONER

## 2021-09-10 RX ORDER — EPINEPHRINE 1 MG/ML
0.3 INJECTION, SOLUTION INTRAMUSCULAR; SUBCUTANEOUS AS NEEDED
Status: DISCONTINUED | OUTPATIENT
Start: 2021-09-10 | End: 2021-09-12 | Stop reason: HOSPADM

## 2021-09-10 RX ORDER — DIPHENHYDRAMINE HYDROCHLORIDE 50 MG/ML
50 INJECTION INTRAMUSCULAR; INTRAVENOUS AS NEEDED
Status: CANCELLED | OUTPATIENT
Start: 2021-09-10

## 2021-09-10 RX ORDER — DIPHENHYDRAMINE HYDROCHLORIDE 50 MG/ML
50 INJECTION INTRAMUSCULAR; INTRAVENOUS AS NEEDED
Status: DISCONTINUED | OUTPATIENT
Start: 2021-09-10 | End: 2021-09-12 | Stop reason: HOSPADM

## 2021-09-10 RX ORDER — METHYLPREDNISOLONE SODIUM SUCCINATE 125 MG/2ML
125 INJECTION, POWDER, LYOPHILIZED, FOR SOLUTION INTRAMUSCULAR; INTRAVENOUS AS NEEDED
Status: DISCONTINUED | OUTPATIENT
Start: 2021-09-10 | End: 2021-09-12 | Stop reason: HOSPADM

## 2021-09-10 RX ORDER — EPINEPHRINE 1 MG/ML
0.3 INJECTION, SOLUTION INTRAMUSCULAR; SUBCUTANEOUS AS NEEDED
Status: CANCELLED | OUTPATIENT
Start: 2021-09-10

## 2021-09-10 RX ORDER — METHYLPREDNISOLONE SODIUM SUCCINATE 125 MG/2ML
125 INJECTION, POWDER, LYOPHILIZED, FOR SOLUTION INTRAMUSCULAR; INTRAVENOUS AS NEEDED
Status: CANCELLED | OUTPATIENT
Start: 2021-09-10

## 2021-09-10 RX ADMIN — CASIRIVIMAB AND IMDEVIMAB: 600; 600 INJECTION, SOLUTION, CONCENTRATE INTRAVENOUS at 08:47

## 2021-09-30 ENCOUNTER — LAB (OUTPATIENT)
Dept: LAB | Facility: HOSPITAL | Age: 46
End: 2021-09-30

## 2021-09-30 ENCOUNTER — TRANSCRIBE ORDERS (OUTPATIENT)
Dept: ADMINISTRATIVE | Facility: HOSPITAL | Age: 46
End: 2021-09-30

## 2021-09-30 DIAGNOSIS — E55.9 AVITAMINOSIS D: ICD-10-CM

## 2021-09-30 DIAGNOSIS — D50.9 IRON DEFICIENCY ANEMIA, UNSPECIFIED IRON DEFICIENCY ANEMIA TYPE: ICD-10-CM

## 2021-09-30 DIAGNOSIS — D51.9 ANEMIA DUE TO VITAMIN B12 DEFICIENCY, UNSPECIFIED B12 DEFICIENCY TYPE: Primary | ICD-10-CM

## 2021-09-30 DIAGNOSIS — D51.9 ANEMIA DUE TO VITAMIN B12 DEFICIENCY, UNSPECIFIED B12 DEFICIENCY TYPE: ICD-10-CM

## 2021-09-30 DIAGNOSIS — I10 ESSENTIAL HYPERTENSION, MALIGNANT: ICD-10-CM

## 2021-09-30 DIAGNOSIS — Z11.59 SPECIAL SCREENING EXAMINATION FOR VIRAL DISEASE: Primary | ICD-10-CM

## 2021-09-30 LAB
25(OH)D3 SERPL-MCNC: 39.9 NG/ML
ALBUMIN SERPL-MCNC: 3.9 G/DL (ref 3.5–5.2)
ALBUMIN/GLOB SERPL: 1.6 G/DL
ALP SERPL-CCNC: 74 U/L (ref 39–117)
ALT SERPL W P-5'-P-CCNC: 37 U/L (ref 1–33)
ANION GAP SERPL CALCULATED.3IONS-SCNC: 9 MMOL/L (ref 5–15)
AST SERPL-CCNC: 25 U/L (ref 1–32)
BASOPHILS # BLD AUTO: 0.04 10*3/MM3 (ref 0–0.2)
BASOPHILS NFR BLD AUTO: 0.6 % (ref 0–1.5)
BILIRUB SERPL-MCNC: 0.2 MG/DL (ref 0–1.2)
BILIRUB UR QL STRIP: NEGATIVE
BUN SERPL-MCNC: 10 MG/DL (ref 6–20)
BUN/CREAT SERPL: 10.1 (ref 7–25)
CALCIUM SPEC-SCNC: 9 MG/DL (ref 8.6–10.5)
CHLORIDE SERPL-SCNC: 104 MMOL/L (ref 98–107)
CLARITY UR: CLEAR
CO2 SERPL-SCNC: 27 MMOL/L (ref 22–29)
COLOR UR: YELLOW
CREAT SERPL-MCNC: 0.99 MG/DL (ref 0.57–1)
DEPRECATED RDW RBC AUTO: 44.8 FL (ref 37–54)
EOSINOPHIL # BLD AUTO: 0.23 10*3/MM3 (ref 0–0.4)
EOSINOPHIL NFR BLD AUTO: 3.3 % (ref 0.3–6.2)
ERYTHROCYTE [DISTWIDTH] IN BLOOD BY AUTOMATED COUNT: 13.5 % (ref 12.3–15.4)
ESTRADIOL SERPL HS-MCNC: 329 PG/ML
FERRITIN SERPL-MCNC: 46.9 NG/ML (ref 13–150)
FOLATE SERPL-MCNC: 9.68 NG/ML (ref 4.78–24.2)
GFR SERPL CREATININE-BSD FRML MDRD: 60 ML/MIN/1.73
GLOBULIN UR ELPH-MCNC: 2.5 GM/DL
GLUCOSE SERPL-MCNC: 78 MG/DL (ref 65–99)
GLUCOSE UR STRIP-MCNC: NEGATIVE MG/DL
HCT VFR BLD AUTO: 41.5 % (ref 34–46.6)
HGB BLD-MCNC: 13.5 G/DL (ref 12–15.9)
HGB UR QL STRIP.AUTO: NEGATIVE
IMM GRANULOCYTES # BLD AUTO: 0.01 10*3/MM3 (ref 0–0.05)
IMM GRANULOCYTES NFR BLD AUTO: 0.1 % (ref 0–0.5)
KETONES UR QL STRIP: NEGATIVE
LEUKOCYTE ESTERASE UR QL STRIP.AUTO: ABNORMAL
LYMPHOCYTES # BLD AUTO: 3.45 10*3/MM3 (ref 0.7–3.1)
LYMPHOCYTES NFR BLD AUTO: 49 % (ref 19.6–45.3)
MCH RBC QN AUTO: 29.6 PG (ref 26.6–33)
MCHC RBC AUTO-ENTMCNC: 32.5 G/DL (ref 31.5–35.7)
MCV RBC AUTO: 91 FL (ref 79–97)
MONOCYTES # BLD AUTO: 0.49 10*3/MM3 (ref 0.1–0.9)
MONOCYTES NFR BLD AUTO: 7 % (ref 5–12)
NEUTROPHILS NFR BLD AUTO: 2.82 10*3/MM3 (ref 1.7–7)
NEUTROPHILS NFR BLD AUTO: 40 % (ref 42.7–76)
NITRITE UR QL STRIP: NEGATIVE
NRBC BLD AUTO-RTO: 0 /100 WBC (ref 0–0.2)
PH UR STRIP.AUTO: 6 [PH] (ref 5–8)
PLATELET # BLD AUTO: 233 10*3/MM3 (ref 140–450)
PMV BLD AUTO: 9.9 FL (ref 6–12)
POTASSIUM SERPL-SCNC: 4.4 MMOL/L (ref 3.5–5.2)
PROGEST SERPL-MCNC: 0.21 NG/ML
PROT SERPL-MCNC: 6.4 G/DL (ref 6–8.5)
PROT UR QL STRIP: NEGATIVE
RBC # BLD AUTO: 4.56 10*6/MM3 (ref 3.77–5.28)
SODIUM SERPL-SCNC: 140 MMOL/L (ref 136–145)
SP GR UR STRIP: 1.02 (ref 1–1.03)
T-UPTAKE NFR SERPL: 1.17 TBI (ref 0.8–1.3)
T4 SERPL-MCNC: 7.36 MCG/DL (ref 4.5–11.7)
TSH SERPL DL<=0.05 MIU/L-ACNC: 6.39 UIU/ML (ref 0.27–4.2)
UROBILINOGEN UR QL STRIP: ABNORMAL
VIT B12 BLD-MCNC: 466 PG/ML (ref 211–946)
WBC # BLD AUTO: 7.04 10*3/MM3 (ref 3.4–10.8)

## 2021-09-30 PROCEDURE — 84402 ASSAY OF FREE TESTOSTERONE: CPT

## 2021-09-30 PROCEDURE — 81003 URINALYSIS AUTO W/O SCOPE: CPT

## 2021-09-30 PROCEDURE — 84144 ASSAY OF PROGESTERONE: CPT

## 2021-09-30 PROCEDURE — 82607 VITAMIN B-12: CPT

## 2021-09-30 PROCEDURE — 82670 ASSAY OF TOTAL ESTRADIOL: CPT

## 2021-09-30 PROCEDURE — 36415 COLL VENOUS BLD VENIPUNCTURE: CPT

## 2021-09-30 PROCEDURE — 82728 ASSAY OF FERRITIN: CPT

## 2021-09-30 PROCEDURE — 84479 ASSAY OF THYROID (T3 OR T4): CPT

## 2021-09-30 PROCEDURE — 80053 COMPREHEN METABOLIC PANEL: CPT

## 2021-09-30 PROCEDURE — 82746 ASSAY OF FOLIC ACID SERUM: CPT

## 2021-09-30 PROCEDURE — 82306 VITAMIN D 25 HYDROXY: CPT

## 2021-09-30 PROCEDURE — 85025 COMPLETE CBC W/AUTO DIFF WBC: CPT

## 2021-09-30 PROCEDURE — 84436 ASSAY OF TOTAL THYROXINE: CPT

## 2021-09-30 PROCEDURE — 84403 ASSAY OF TOTAL TESTOSTERONE: CPT

## 2021-09-30 PROCEDURE — 84443 ASSAY THYROID STIM HORMONE: CPT

## 2021-10-06 LAB
TESTOST FREE SERPL-MCNC: 0.9 PG/ML (ref 0–4.2)
TESTOST SERPL-MCNC: 20.4 NG/DL

## 2021-11-04 ENCOUNTER — HOSPITAL ENCOUNTER (OUTPATIENT)
Dept: MAMMOGRAPHY | Facility: HOSPITAL | Age: 46
Discharge: HOME | End: 2021-11-04
Admitting: NURSE PRACTITIONER

## 2021-11-04 DIAGNOSIS — Z12.31 VISIT FOR SCREENING MAMMOGRAM: ICD-10-CM

## 2021-11-04 PROCEDURE — 77067 SCR MAMMO BI INCL CAD: CPT

## 2021-11-04 PROCEDURE — 77063 BREAST TOMOSYNTHESIS BI: CPT | Performed by: RADIOLOGY

## 2021-11-04 PROCEDURE — 77063 BREAST TOMOSYNTHESIS BI: CPT

## 2021-11-04 PROCEDURE — 77067 SCR MAMMO BI INCL CAD: CPT | Performed by: RADIOLOGY

## 2021-12-29 ENCOUNTER — IMMUNIZATION (OUTPATIENT)
Dept: VACCINE CLINIC | Facility: HOSPITAL | Age: 46
End: 2021-12-29

## 2021-12-29 PROCEDURE — 0004A HC ADM SARSCOV2 30MCG/0.3ML BOOSTER: CPT | Performed by: INTERNAL MEDICINE

## 2021-12-29 PROCEDURE — 91300 HC SARSCOV02 VAC 30MCG/0.3ML IM: CPT | Performed by: INTERNAL MEDICINE

## 2022-04-12 ENCOUNTER — LAB (OUTPATIENT)
Dept: LAB | Facility: HOSPITAL | Age: 47
End: 2022-04-12

## 2022-04-12 ENCOUNTER — TRANSCRIBE ORDERS (OUTPATIENT)
Dept: ADMINISTRATIVE | Facility: HOSPITAL | Age: 47
End: 2022-04-12

## 2022-04-12 DIAGNOSIS — E28.0 ESTROGEN EXCESS: Primary | ICD-10-CM

## 2022-04-12 DIAGNOSIS — E55.9 VITAMIN D DEFICIENCY: ICD-10-CM

## 2022-04-12 DIAGNOSIS — E28.0 ESTROGEN EXCESS: ICD-10-CM

## 2022-04-12 DIAGNOSIS — E03.9 HYPOTHYROIDISM, UNSPECIFIED TYPE: ICD-10-CM

## 2022-04-12 DIAGNOSIS — D50.9 IRON DEFICIENCY ANEMIA, UNSPECIFIED IRON DEFICIENCY ANEMIA TYPE: ICD-10-CM

## 2022-04-12 DIAGNOSIS — D51.9 ANEMIA DUE TO VITAMIN B12 DEFICIENCY, UNSPECIFIED B12 DEFICIENCY TYPE: ICD-10-CM

## 2022-04-12 LAB
ESTRADIOL SERPL HS-MCNC: 609 PG/ML
FERRITIN SERPL-MCNC: 115 NG/ML (ref 13–150)
PROGEST SERPL-MCNC: 0.16 NG/ML
T3 SERPL-MCNC: 187 NG/DL (ref 80–200)
T4 SERPL-MCNC: 9.08 MCG/DL (ref 4.5–11.7)
TSH SERPL DL<=0.05 MIU/L-ACNC: 3.29 UIU/ML (ref 0.27–4.2)

## 2022-04-12 PROCEDURE — 36415 COLL VENOUS BLD VENIPUNCTURE: CPT

## 2022-04-12 PROCEDURE — 84402 ASSAY OF FREE TESTOSTERONE: CPT

## 2022-04-12 PROCEDURE — 83525 ASSAY OF INSULIN: CPT

## 2022-04-12 PROCEDURE — 84403 ASSAY OF TOTAL TESTOSTERONE: CPT

## 2022-04-12 PROCEDURE — 84436 ASSAY OF TOTAL THYROXINE: CPT

## 2022-04-12 PROCEDURE — 84480 ASSAY TRIIODOTHYRONINE (T3): CPT

## 2022-04-12 PROCEDURE — 84144 ASSAY OF PROGESTERONE: CPT

## 2022-04-12 PROCEDURE — 82670 ASSAY OF TOTAL ESTRADIOL: CPT

## 2022-04-12 PROCEDURE — 84443 ASSAY THYROID STIM HORMONE: CPT

## 2022-04-12 PROCEDURE — 82728 ASSAY OF FERRITIN: CPT

## 2022-04-13 LAB — INSULIN SERPL-ACNC: 17.5 UIU/ML (ref 2.6–24.9)

## 2022-04-15 LAB
TESTOST FREE SERPL-MCNC: 0.5 PG/ML (ref 0–4.2)
TESTOST SERPL-MCNC: 5 NG/DL (ref 4–50)

## 2022-08-18 ENCOUNTER — DOCUMENTATION (OUTPATIENT)
Dept: FAMILY MEDICINE CLINIC | Age: 47
End: 2022-08-18

## 2022-08-18 DIAGNOSIS — E66.9 CLASS 2 OBESITY: Primary | ICD-10-CM

## 2022-09-03 ENCOUNTER — LAB (OUTPATIENT)
Dept: LAB | Facility: HOSPITAL | Age: 47
End: 2022-09-03

## 2022-09-03 ENCOUNTER — TRANSCRIBE ORDERS (OUTPATIENT)
Dept: ADMINISTRATIVE | Facility: HOSPITAL | Age: 47
End: 2022-09-03

## 2022-09-03 DIAGNOSIS — E28.0 HYPERESTROGENISM: Primary | ICD-10-CM

## 2022-09-03 DIAGNOSIS — N92.4 EXCESSIVE BLEEDING IN THE PREMENOPAUSAL PERIOD: ICD-10-CM

## 2022-09-03 DIAGNOSIS — E28.0 HYPERESTROGENISM: ICD-10-CM

## 2022-09-03 LAB
PROGEST SERPL-MCNC: 9.1 NG/ML
T-UPTAKE NFR SERPL: 1.12 TBI (ref 0.8–1.3)
T4 SERPL-MCNC: 8.35 MCG/DL (ref 4.5–11.7)
TSH SERPL DL<=0.05 MIU/L-ACNC: 2.02 UIU/ML (ref 0.27–4.2)

## 2022-09-03 PROCEDURE — 84436 ASSAY OF TOTAL THYROXINE: CPT

## 2022-09-03 PROCEDURE — 84402 ASSAY OF FREE TESTOSTERONE: CPT

## 2022-09-03 PROCEDURE — 84479 ASSAY OF THYROID (T3 OR T4): CPT

## 2022-09-03 PROCEDURE — 36415 COLL VENOUS BLD VENIPUNCTURE: CPT

## 2022-09-03 PROCEDURE — 82626 DEHYDROEPIANDROSTERONE: CPT

## 2022-09-03 PROCEDURE — 84443 ASSAY THYROID STIM HORMONE: CPT

## 2022-09-03 PROCEDURE — 83525 ASSAY OF INSULIN: CPT

## 2022-09-03 PROCEDURE — 84403 ASSAY OF TOTAL TESTOSTERONE: CPT

## 2022-09-03 PROCEDURE — 82670 ASSAY OF TOTAL ESTRADIOL: CPT

## 2022-09-03 PROCEDURE — 84144 ASSAY OF PROGESTERONE: CPT

## 2022-09-04 LAB — ESTRADIOL SERPL HS-MCNC: 441 PG/ML

## 2022-09-06 LAB — INSULIN SERPL-ACNC: 31.1 UIU/ML (ref 2.6–24.9)

## 2022-09-08 ENCOUNTER — SPECIALTY PHARMACY (OUTPATIENT)
Dept: PHARMACY | Facility: HOSPITAL | Age: 47
End: 2022-09-08

## 2022-09-08 ENCOUNTER — DISEASE STATE MANAGEMENT VISIT (OUTPATIENT)
Dept: PHARMACY | Facility: HOSPITAL | Age: 47
End: 2022-09-08

## 2022-09-08 LAB
TESTOST FREE SERPL-MCNC: 0.6 PG/ML (ref 0–4.2)
TESTOST SERPL-MCNC: 16 NG/DL (ref 4–50)

## 2022-09-08 RX ORDER — LIRAGLUTIDE 6 MG/ML
0.6 INJECTION, SOLUTION SUBCUTANEOUS DAILY
Qty: 15 ML | Refills: 0 | Status: SHIPPED | OUTPATIENT
Start: 2022-09-08 | End: 2022-11-11

## 2022-09-08 NOTE — PROGRESS NOTES
[facility]  Weight Management Program        Malgorzata Jimenez is a 46 y.o. female referred to the [facility] by [facility] for clinical pharmacy and specialty pharmacy management of [facility] for weight management.  Malgorzata Jimenez has previously tried keto diet, weight watchers, Adipex and lifestyle changes for weight loss.  Current weight loss efforts include lifestyle changes watching what she eats. The patient denies a personal history or family history of thyroid cancer and denies a personal history of pancreatitis.     Relevant Past Medical History and Co-morbidities  Past Medical History:   Diagnosis Date   • Allergic rhinitis due to pollen    • Deviated septum      Social History     Socioeconomic History   • Marital status:    Tobacco Use   • Smoking status: Never Smoker   • Smokeless tobacco: Never Used   Substance and Sexual Activity   • Alcohol use: No   • Drug use: No   • Sexual activity: Defer       Allergies  Penicillins    Current Medication List    Current Outpatient Medications:   •  butalbital-acetaminophen-caffeine (FIORICET, ESGIC) -40 MG per tablet, Take 1 tablet by mouth Every 6 to 8 Hours for headaches., Disp: 20 tablet, Rfl: 0  •  docusate calcium (SURFAK) 240 MG capsule, Take 1 capsule by mouth Daily., Disp: 30 capsule, Rfl: 2  •  docusate calcium (SURFAK) 240 MG capsule, Take 1 capsule by mouth daily for constipation, Disp: 30 capsule, Rfl: 2  •  docusate calcium (SURFAK) 240 MG capsule, Take 1 capsule by mouth daily; constipation, Disp: 30 capsule, Rfl: 2  •  docusate calcium (SURFAK) 240 MG capsule, Take 1 capsule by mouth daily; constipation, Disp: 30 capsule, Rfl: 2  •  docusate calcium (Surfak) 240 MG capsule, Take 1 capsule by mouth daily; constipation, Disp: 30 capsule, Rfl: 2  •  docusate calcium (Surfak) 240 MG capsule, Take 1 capsule by mouth daily; constipation, Disp: 30 capsule, Rfl: 2  •  EPINEPHrine (EPIPEN) 0.3  MG/0.3ML solution auto-injector injection, Use as direced., Disp: 2 each, Rfl: 3  •  EPINEPHrine (EPIPEN) 0.3 MG/0.3ML solution auto-injector injection, Inject 1 pen injector intramuscularly as directed to be used for anaphylaxis only, Disp: 2 each, Rfl: 2  •  escitalopram (LEXAPRO) 10 MG tablet, Take 1 tablet by mouth every night at bedtime., Disp: 30 tablet, Rfl: 2  •  escitalopram (LEXAPRO) 10 MG tablet, Take 1 tablet by mouth Every Night., Disp: 30 tablet, Rfl: 2  •  escitalopram (Lexapro) 20 MG tablet, Take 1 tablet by mouth daily, Disp: 30 tablet, Rfl: 2  •  ferrous sulfate (KP Ferrous Sulfate) 325 (65 FE) MG tablet, Take 1 tablet by mouth twice a day, Disp: 60 tablet, Rfl: 2  •  ferrous sulfate (KP Ferrous Sulfate) 325 (65 FE) MG tablet, Take 1 tablet by mouth twice a day, Disp: 60 tablet, Rfl: 2  •  ferrous sulfate 325 (65 FE) MG tablet, Take 1 tablet by mouth 2 (Two) Times a Day., Disp: 60 tablet, Rfl: 2  •  ferrous sulfate 325 (65 FE) MG tablet, Take 1 tablet by mouth 2 (Two) Times a Day., Disp: 60 tablet, Rfl: 2  •  ferrous sulfate 325 (65 FE) MG tablet, Take 1 tablet by mouth 2 (Two) Times a Day., Disp: 60 tablet, Rfl: 2  •  ferrous sulfate 325 (65 FE) MG tablet, Take 1 tablet by mouth twice a day, Disp: 60 tablet, Rfl: 2  •  ferrous sulfate 325 (65 FE) MG tablet, Take 1 tablet by mouth twice a day, Disp: 60 tablet, Rfl: 2  •  fluticasone (FLONASE) 50 MCG/ACT nasal spray, USE 2 SPRAYS IN EACH NOSTRIL DAILY, Disp: 16 g, Rfl: 5  •  fluticasone (FLONASE) 50 MCG/ACT nasal spray, Use 2 sprays in each nostril once daily, Disp: 16 g, Rfl: 5  •  ibuprofen (ADVIL,MOTRIN) 800 MG tablet, Take 1 tablet by mouth 2 to 3 Times a Day. (Patient taking differently: Take 800 mg by mouth 3 (Three) Times a Day. Patient takes PRN), Disp: 90 tablet, Rfl: 1  •  Insulin Pen Needle 32G X 4 MM misc, Use to inject Saxenda daily as directed, Disp: 100 each, Rfl: 0  •  ipratropium (ATROVENT) 0.06 % nasal spray, USE 2 SPRAYS IN EACH  NOSTRIL 3 TIMES DAILY AS NEEDED FOR NASAL CONGESTION, Disp: 15 mL, Rfl: 5  •  ipratropium (ATROVENT) 0.06 % nasal spray, Use 2 sprays in each nostril 3 times daily, Disp: 15 mL, Rfl: 5  •  levothyroxine (Synthroid) 25 MCG tablet, Take 1 tablet by mouth daily; thyroid, Disp: 30 tablet, Rfl: 2  •  levothyroxine (Synthroid) 25 MCG tablet, Take 1 tablet by mouth daily for thyroid., Disp: 30 tablet, Rfl: 2  •  levothyroxine (SYNTHROID, LEVOTHROID) 25 MCG tablet, Take 1 tablet by mouth Daily for thyroid, Disp: 30 tablet, Rfl: 2  •  levothyroxine (SYNTHROID, LEVOTHROID) 25 MCG tablet, Take 1 tablet by mouth daily for thyroid, Disp: 30 tablet, Rfl: 2  •  Liraglutide (Saxenda) 18 MG/3ML injection pen, Inject 0.6 mg under the skin into the appropriate area as directed Daily. For 1 week, then increase by increments of 0.6 mg daily every week to a max dose of 3 mg/day., Disp: 15 mL, Rfl: 0  •  Progesterone (Prometrium) 200 MG capsule, Take 1 capsule by mouth 3 times weekly, Disp: 13 capsule, Rfl: 2  •  vitamin D (ERGOCALCIFEROL) 1.25 MG (74005 UT) capsule capsule, Take 1 capsule by mouth 1 (One) Time Per Week with meal., Disp: 4 capsule, Rfl: 2  •  vitamin D (ERGOCALCIFEROL) 1.25 MG (62496 UT) capsule capsule, Take 1 capsule by mouth weekly; take with meals, Disp: 4 capsule, Rfl: 2  •  vitamin D (ERGOCALCIFEROL) 1.25 MG (03958 UT) capsule capsule, Take 1 capsule by mouth weekly; take with meals, Disp: 4 capsule, Rfl: 2  •  vitamin D (ERGOCALCIFEROL) 1.25 MG (00711 UT) capsule capsule, Take 1 capsule by mouth weekly; take with meals, Disp: 4 capsule, Rfl: 2  •  vitamin D (ERGOCALCIFEROL) 1.25 MG (96019 UT) capsule capsule, Take 1 capsule by mouth weekly; take with meals, Disp: 4 capsule, Rfl: 2    Drug Interactions  Saxenda+ Lexapro: May increase hypoglycemic effects  Saxenda + Prometrium: May decrease effects of Saxenda   Levothyroxine+ Wegovy: Wegovy can increase concentration of Levothyroxine. Increase frequency of monitoring  thyroid labs     Relevant Laboratory Values  Lab Results   Component Value Date    CHOL 181 09/05/2017    TRIG 173 (H) 09/05/2017    HDL 56 (L) 09/05/2017    LDL 90 09/05/2017     There is no height or weight on file to calculate BMI.    Vaccinations:   Patient recommended to keep up with routine vaccinations.     Patient Education    Trueplus Pen needles NDC [address]-0    SAXENDA® (liraglutide)  Medication Expectations   Why am I taking this medication? You are taking Saxenda for weight loss because you have excess weight and also have weight-related medical problems or obesity. It should be used along with a reduced calorie diet and increased physical activity.    What should I expect while on this medication? You should expect to lose at least 4% of your body weight after 4 months of therapy. It can also help keep weight lost off.    How does the medication work? Saxenda is an injection that addresses one of your body's natural responses to weight loss. It works like a naturally produced hormone in the body called GLP-1 that regulates appetite which can lead to eating fewer calories and losing weight. This medication also slows down food from leaving your stomach, making you feel english for longer.   How long will I be on this medication for? The amount of time you will be on this medication will be determined by your doctor. Do not abruptly stop this medication without talking to your doctor first.    How do I take this medication? Take as directed on your prescription label. Saxenda is injected under the skin (subcutaneously) of your stomach, thigh, or upper arm. This medication should be taken once daily and can be given with or without food. Use a different injection site in the same body region each day.     For each new prefilled pen, prime the needle before the first injection by turning the dose selector to the flow check symbol and injecting into the air (priming is not required for subsequent  injections). Use a new needle for each injection. Once the needle is inserted, continue to press the button until the dial has returned to 0 and for an additional 6 seconds. Then remove the needle and discard.    What are some possible side effects? You may notice you don't feel as hungry, especially when you first start using Saxenda. Some common side effects include nausea, vomiting, diarrhea, vomiting, indigestion, constipation, tiredness, and headache. Redness, itching, and/or swelling can occur where the shot was given. You should also monitor for low blood sugar (hypoglycemia), especially if you are taking Saxenda with other medications that cause low blood sugar. Stop using Saxenda and call your doctor immediately if you have severe pain in your stomach area that will not go away as this could be a sign of pancreatitis (inflammation of your pancreas).     What happens if I miss a dose? If you miss a dose, take it as soon as you remember. If it is close to your next dose, skip it and go back to your regular dosing schedule. Never take 2 doses at once. If you miss your dose for 3 days or more, call your doctor to talk about how to restart Saxenda.      Medication Safety   What are things I should warn my doctor immediately about? Do not use Saxenda if you or a family member have ever had medullary thyroid cancer (MTC) or Multiple Endocrine Neoplasia syndrome type 2 (MEN 2). Tell your doctor if you get a lump or swelling in your neck, hoarseness, difficulty swallowing, or feel short of breath (these may be symptoms of thyroid cancer). Talk to your doctor if you have or have had problems with your pancreas, kidneys, or liver. Tell your doctor if you have problems with digesting food or slowed emptying of your stomach (gastroparesis). Talk to your doctor if you are pregnant, planning to become pregnant, or breastfeeding. Also tell your doctor if you notice any signs/symptoms of an allergic reaction (rash, hives,  difficulty breathing, etc.).   What are things that I should be cautious of? Be cautious of any side effects from this medication. Talk to your doctor if any new ones develop or aren't getting better.   What are some medications that can interact with this one? Taking Saxenda with other medications that lower your blood sugar such as insulins and glipizide/glimepiride/glyburide may increase the risk of low blood sugar. It should not be taken with other medicines called GLP-1 receptor agonists, as these work the same way as Saxenda. Because Saxenda slows stomach emptying, it can affect the way some medicines work. Always tell your doctor or pharmacist immediately if you start taking any new medications, including over-the-counter medications, vitamins, and herbal supplements.      Medication Storage/Handling   How should I handle this medication? Keep this medication out of reach of pets/children and keep the pen capped when not in use. Do not share your medicine pens with others.    How does this medication need to be stored? Store your new, unused pens in the original carton in the refrigerator. Protect it from light. Do not freeze. You may store your opened pen at room temperature or in the refrigerator for up to 30 days. Always remove the needle from the pen before storing.    How should I dispose of this medication? Used Saxenda pens should be thrown away after 30 days. Place your used pen and needle in an approved sharps container. If you do not have a sharps container, you may use a household container made of heavy-duty plastic with a tight-fitting lid that is leak resistant (e.g., heavy-duty plastic laundry detergent bottle). If your doctor decides to stop this medication, take to your local police station for proper disposal. Some pharmacies also have take-back bins for medication drop-off.       Resources/Support   How can I remind myself to take this medication? You can download reminder apps to help you  "manage your refills. You may also set an alarm on your phone to remind you.    Is financial support available?  [employer] can provide co-pay cards if you have commercial insurance.    Which vaccines are recommended for me? Talk to your doctor about these vaccines: Flu, Coronavirus (COVID-19), Pneumococcal (pneumonia), Tdap, Zoster (shingles)          Goals of Therapy  • Clinical Goals or Therapeutic Targets: 10% weight loss goal      Date 9/8/22       Weight (lb) 235.4 lbs       BMI kg/ 43.05       Waist Circumference (in) 47\"           Medication Assessment & Plan    Patient's current BMI is 43.05, which is considered Class III Obesity    Will order Saxenda 0.6 mg SC daily for 1 week and increase by increments of 0.6 mg daily every week to a max dose of 3 mg/day . The following medications will need dose adjustments/closer monitoring once the GLP1 is started: none    Patient was educated to monitor for s/sx of hypoglycemia and about the rule of 15 to correct blood sugar if needed. Patient verbally acknowledged understanding.     Discussed lifestyle modifications, including diet and exercise.  Patient education provided.     Worked with patient to create SMART Goal(s):   1. Increase water intake 64 oz per day.  2. Decrease soda intake from 4 cans of soda per day to 2 per day.         Crystal Ball. Best, PharmD  9/8/2022  10:02 EDT                    "

## 2022-09-08 NOTE — PROGRESS NOTES
[facility]  Weight Management Program        Malgorzata Jimenez is a 46 y.o. female referred to the [facility] by Ben Jaimes for clinical pharmacy and specialty pharmacy management of [facility] for weight management.  Malgorzata Jimenez has previously tried keto diet, weight watchers, Adipex and lifestyle changes for weight loss.  Current weight loss efforts include lifestyle changes watching what she eats. The patient denies a personal history or family history of thyroid cancer and denies a personal history of pancreatitis.     Relevant Past Medical History and Co-morbidities  Past Medical History:   Diagnosis Date   • Allergic rhinitis due to pollen    • Deviated septum      Social History     Socioeconomic History   • Marital status:    Tobacco Use   • Smoking status: Never Smoker   • Smokeless tobacco: Never Used   Substance and Sexual Activity   • Alcohol use: No   • Drug use: No   • Sexual activity: Defer       Allergies  Penicillins    Current Medication List    Current Outpatient Medications:   •  butalbital-acetaminophen-caffeine (FIORICET, ESGIC) -40 MG per tablet, Take 1 tablet by mouth Every 6 to 8 Hours for headaches., Disp: 20 tablet, Rfl: 0  •  docusate calcium (SURFAK) 240 MG capsule, Take 1 capsule by mouth Daily., Disp: 30 capsule, Rfl: 2  •  EPINEPHrine (EPIPEN) 0.3 MG/0.3ML solution auto-injector injection, Inject 1 pen injector intramuscularly as directed to be used for anaphylaxis only, Disp: 2 each, Rfl: 2  •  escitalopram (Lexapro) 20 MG tablet, Take 1 tablet by mouth daily, Disp: 30 tablet, Rfl: 2  •  ferrous sulfate (KP Ferrous Sulfate) 325 (65 FE) MG tablet, Take 1 tablet by mouth twice a day, Disp: 60 tablet, Rfl: 2  •  fluticasone (FLONASE) 50 MCG/ACT nasal spray, USE 2 SPRAYS IN EACH NOSTRIL DAILY, Disp: 16 g, Rfl: 5  •  ibuprofen (ADVIL,MOTRIN) 800 MG tablet, Take 1 tablet by mouth 2 to 3 Times a Day. (Patient taking differently:  Take 800 mg by mouth 3 (Three) Times a Day. Patient takes PRN), Disp: 90 tablet, Rfl: 1  •  ipratropium (ATROVENT) 0.06 % nasal spray, USE 2 SPRAYS IN EACH NOSTRIL 3 TIMES DAILY AS NEEDED FOR NASAL CONGESTION, Disp: 15 mL, Rfl: 5  •  levothyroxine (Synthroid) 25 MCG tablet, Take 1 tablet by mouth daily for thyroid., Disp: 30 tablet, Rfl: 2  •  Progesterone (Prometrium) 200 MG capsule, Take 1 capsule by mouth 3 times weekly, Disp: 13 capsule, Rfl: 2  •  vitamin D (ERGOCALCIFEROL) 1.25 MG (17316 UT) capsule capsule, Take 1 capsule by mouth 1 (One) Time Per Week with meal., Disp: 4 capsule, Rfl: 2  •  docusate calcium (SURFAK) 240 MG capsule, Take 1 capsule by mouth daily for constipation, Disp: 30 capsule, Rfl: 2  •  docusate calcium (SURFAK) 240 MG capsule, Take 1 capsule by mouth daily; constipation, Disp: 30 capsule, Rfl: 2  •  docusate calcium (SURFAK) 240 MG capsule, Take 1 capsule by mouth daily; constipation, Disp: 30 capsule, Rfl: 2  •  docusate calcium (Surfak) 240 MG capsule, Take 1 capsule by mouth daily; constipation, Disp: 30 capsule, Rfl: 2  •  docusate calcium (Surfak) 240 MG capsule, Take 1 capsule by mouth daily; constipation, Disp: 30 capsule, Rfl: 2  •  EPINEPHrine (EPIPEN) 0.3 MG/0.3ML solution auto-injector injection, Use as direced., Disp: 2 each, Rfl: 3  •  escitalopram (LEXAPRO) 10 MG tablet, Take 1 tablet by mouth every night at bedtime., Disp: 30 tablet, Rfl: 2  •  escitalopram (LEXAPRO) 10 MG tablet, Take 1 tablet by mouth Every Night., Disp: 30 tablet, Rfl: 2  •  ferrous sulfate (KP Ferrous Sulfate) 325 (65 FE) MG tablet, Take 1 tablet by mouth twice a day, Disp: 60 tablet, Rfl: 2  •  ferrous sulfate 325 (65 FE) MG tablet, Take 1 tablet by mouth 2 (Two) Times a Day., Disp: 60 tablet, Rfl: 2  •  ferrous sulfate 325 (65 FE) MG tablet, Take 1 tablet by mouth 2 (Two) Times a Day., Disp: 60 tablet, Rfl: 2  •  ferrous sulfate 325 (65 FE) MG tablet, Take 1 tablet by mouth 2 (Two) Times a Day.,  Disp: 60 tablet, Rfl: 2  •  ferrous sulfate 325 (65 FE) MG tablet, Take 1 tablet by mouth twice a day, Disp: 60 tablet, Rfl: 2  •  ferrous sulfate 325 (65 FE) MG tablet, Take 1 tablet by mouth twice a day, Disp: 60 tablet, Rfl: 2  •  fluticasone (FLONASE) 50 MCG/ACT nasal spray, Use 2 sprays in each nostril once daily, Disp: 16 g, Rfl: 5  •  Insulin Pen Needle 32G X 4 MM misc, Use to inject Saxenda daily as directed, Disp: 100 each, Rfl: 0  •  ipratropium (ATROVENT) 0.06 % nasal spray, Use 2 sprays in each nostril 3 times daily, Disp: 15 mL, Rfl: 5  •  levothyroxine (Synthroid) 25 MCG tablet, Take 1 tablet by mouth daily; thyroid, Disp: 30 tablet, Rfl: 2  •  levothyroxine (SYNTHROID, LEVOTHROID) 25 MCG tablet, Take 1 tablet by mouth Daily for thyroid, Disp: 30 tablet, Rfl: 2  •  levothyroxine (SYNTHROID, LEVOTHROID) 25 MCG tablet, Take 1 tablet by mouth daily for thyroid, Disp: 30 tablet, Rfl: 2  •  Liraglutide (Saxenda) 18 MG/3ML injection pen, Inject 0.6 mg under the skin into the appropriate area as directed Daily for 1 week. Then, increase by 0.6 mg daily at weekly intervals to a max dose of 3 mg/day., Disp: 15 mL, Rfl: 0  •  vitamin D (ERGOCALCIFEROL) 1.25 MG (32062 UT) capsule capsule, Take 1 capsule by mouth weekly; take with meals, Disp: 4 capsule, Rfl: 2  •  vitamin D (ERGOCALCIFEROL) 1.25 MG (24963 UT) capsule capsule, Take 1 capsule by mouth weekly; take with meals, Disp: 4 capsule, Rfl: 2  •  vitamin D (ERGOCALCIFEROL) 1.25 MG (22946 UT) capsule capsule, Take 1 capsule by mouth weekly; take with meals, Disp: 4 capsule, Rfl: 2  •  vitamin D (ERGOCALCIFEROL) 1.25 MG (65559 UT) capsule capsule, Take 1 capsule by mouth weekly; take with meals, Disp: 4 capsule, Rfl: 2    Drug Interactions  Saxenda+ Lexapro: May increase hypoglycemic effects  Saxenda + Prometrium: May decrease effects of Saxenda   Levothyroxine+ Wegovy: Wegovy can increase concentration of Levothyroxine. Increase frequency of monitoring  thyroid labs       Relevant Laboratory Values  Lab Results   Component Value Date    CHOL 181 09/05/2017    TRIG 173 (H) 09/05/2017    HDL 56 (L) 09/05/2017    LDL 90 09/05/2017     There is no height or weight on file to calculate BMI.    Vaccinations:   Patient recommended to keep up with routine vaccinations.     Patient Education    True[employer] Pen needles NDC [address]-4    SAXENDA® (liraglutide)  Medication Expectations   Why am I taking this medication? You are taking Saxenda for weight loss because you have excess weight and also have weight-related medical problems or obesity. It should be used along with a reduced calorie diet and increased physical activity.    What should I expect while on this medication? You should expect to lose at least 4% of your body weight after 4 months of therapy. It can also help keep weight lost off.    How does the medication work? Saxenda is an injection that addresses one of your body's natural responses to weight loss. It works like a naturally produced hormone in the body called GLP-1 that regulates appetite which can lead to eating fewer calories and losing weight. This medication also slows down food from leaving your stomach, making you feel english for longer.   How long will I be on this medication for? The amount of time you will be on this medication will be determined by your doctor. Do not abruptly stop this medication without talking to your doctor first.    How do I take this medication? Take as directed on your prescription label. Saxenda is injected under the skin (subcutaneously) of your stomach, thigh, or upper arm. This medication should be taken once daily and can be given with or without food. Use a different injection site in the same body region each day.     For each new prefilled pen, prime the needle before the first injection by turning the dose selector to the flow check symbol and injecting into the air (priming is not required for subsequent  injections). Use a new needle for each injection. Once the needle is inserted, continue to press the button until the dial has returned to 0 and for an additional 6 seconds. Then remove the needle and discard.    What are some possible side effects? You may notice you don't feel as hungry, especially when you first start using Saxenda. Some common side effects include nausea, vomiting, diarrhea, vomiting, indigestion, constipation, tiredness, and headache. Redness, itching, and/or swelling can occur where the shot was given. You should also monitor for low blood sugar (hypoglycemia), especially if you are taking Saxenda with other medications that cause low blood sugar. Stop using Saxenda and call your doctor immediately if you have severe pain in your stomach area that will not go away as this could be a sign of pancreatitis (inflammation of your pancreas).     What happens if I miss a dose? If you miss a dose, take it as soon as you remember. If it is close to your next dose, skip it and go back to your regular dosing schedule. Never take 2 doses at once. If you miss your dose for 3 days or more, call your doctor to talk about how to restart Saxenda.      Medication Safety   What are things I should warn my doctor immediately about? Do not use Saxenda if you or a family member have ever had medullary thyroid cancer (MTC) or Multiple Endocrine Neoplasia syndrome type 2 (MEN 2). Tell your doctor if you get a lump or swelling in your neck, hoarseness, difficulty swallowing, or feel short of breath (these may be symptoms of thyroid cancer). Talk to your doctor if you have or have had problems with your pancreas, kidneys, or liver. Tell your doctor if you have problems with digesting food or slowed emptying of your stomach (gastroparesis). Talk to your doctor if you are pregnant, planning to become pregnant, or breastfeeding. Also tell your doctor if you notice any signs/symptoms of an allergic reaction (rash, hives,  difficulty breathing, etc.).   What are things that I should be cautious of? Be cautious of any side effects from this medication. Talk to your doctor if any new ones develop or aren't getting better.   What are some medications that can interact with this one? Taking Saxenda with other medications that lower your blood sugar such as insulins and glipizide/glimepiride/glyburide may increase the risk of low blood sugar. It should not be taken with other medicines called GLP-1 receptor agonists, as these work the same way as Saxenda. Because Saxenda slows stomach emptying, it can affect the way some medicines work. Always tell your doctor or pharmacist immediately if you start taking any new medications, including over-the-counter medications, vitamins, and herbal supplements.      Medication Storage/Handling   How should I handle this medication? Keep this medication out of reach of pets/children and keep the pen capped when not in use. Do not share your medicine pens with others.    How does this medication need to be stored? Store your new, unused pens in the original carton in the refrigerator. Protect it from light. Do not freeze. You may store your opened pen at room temperature or in the refrigerator for up to 30 days. Always remove the needle from the pen before storing.    How should I dispose of this medication? Used Saxenda pens should be thrown away after 30 days. Place your used pen and needle in an approved sharps container. If you do not have a sharps container, you may use a household container made of heavy-duty plastic with a tight-fitting lid that is leak resistant (e.g., heavy-duty plastic laundry detergent bottle). If your doctor decides to stop this medication, take to your local police station for proper disposal. Some pharmacies also have take-back bins for medication drop-off.       Resources/Support   How can I remind myself to take this medication? You can download reminder apps to help you  "manage your refills. You may also set an alarm on your phone to remind you.    Is financial support available?  [employer] can provide co-pay cards if you have commercial insurance.    Which vaccines are recommended for me? Talk to your doctor about these vaccines: Flu, Coronavirus (COVID-19), Pneumococcal (pneumonia), Tdap, Zoster (shingles)          Goals of Therapy  • Clinical Goals or Therapeutic Targets: 10% weight loss goal      Date 9/8/22       Weight (lb) 235.4 lbs       BMI kg/ 43.05       Waist Circumference (in) 47\"           Medication Assessment & Plan    Patient's current BMI is 43.05, which is considered Class III Obesity    Will order Saxenda 0.6 mg SC daily for 1 week and increase by increments of 0.6 mg daily every week to a max dose of 3 mg/day . The following medications will need dose adjustments/closer monitoring once the GLP1 is started: none    Patient was educated to monitor for s/sx of hypoglycemia and about the rule of 15 to correct blood sugar if needed. Patient verbally acknowledged understanding.     Discussed lifestyle modifications, including diet and exercise.  Patient education provided.     Worked with patient to create SMART Goal(s):   1. Increase water intake 64 oz per day.  2. Decrease soda intake from 4 cans of soda per day to 2 per day.         Crystal Ball. Best, PharmD  9/8/2022  12:53 EDT                    "

## 2022-09-09 LAB — DHEA SERPL-MCNC: 85 NG/DL (ref 31–701)

## 2022-10-06 ENCOUNTER — DISEASE STATE MANAGEMENT VISIT (OUTPATIENT)
Dept: PHARMACY | Facility: HOSPITAL | Age: 47
End: 2022-10-06

## 2022-10-06 VITALS
WEIGHT: 224.6 LBS | DIASTOLIC BLOOD PRESSURE: 87 MMHG | HEIGHT: 62 IN | BODY MASS INDEX: 41.33 KG/M2 | SYSTOLIC BLOOD PRESSURE: 128 MMHG | HEART RATE: 73 BPM

## 2022-10-06 NOTE — PROGRESS NOTES
Medication Management Clinic  Weight Management Program        Malgorzata Jimenez is a 46 y.o. female referred to the [facility]ication Management Clinic by Ben Jaimes for clinical pharmacy and specialty pharmacy management of [facility] for weight management.  Malgorzata Jimenez has previously tried keto diet, weight watchers, Adipex and lifestyle changes for weight loss.  Current weight loss efforts include lifestyle changes watching what she eats. The patient denies a personal history or family history of thyroid cancer and denies a personal history of pancreatitis.     Patient denies missing any doses and any GI side effects. She reports to be tolerating the medication well up until she reached the 2.4 mg dose and then started having an injection site reaction. Patient reports to have itching at the injection site area and has tried to switch the areas however it is still producing itching/redness in the area. She has tried to decrease the dose 1.8mg and 1.2mg however she is still having the same reaction. She denies decrease in quality of life and has been using the benadryl spray to manage the side effect.    Relevant Past Medical History and Co-morbidities  Past Medical History:   Diagnosis Date   • Allergic rhinitis due to pollen    • Deviated septum      Social History     Socioeconomic History   • Marital status:    Tobacco Use   • Smoking status: Never Smoker   • Smokeless tobacco: Never Used   Substance and Sexual Activity   • Alcohol use: No   • Drug use: No   • Sexual activity: Defer       Allergies  Penicillins    Current Medication List    Current Outpatient Medications:   •  butalbital-acetaminophen-caffeine (FIORICET, ESGIC) -40 MG per tablet, Take 1 tablet by mouth Every 6 to 8 Hours for headaches., Disp: 20 tablet, Rfl: 0  •  docusate calcium (SURFAK) 240 MG capsule, Take 1 capsule by mouth Daily., Disp: 30 capsule, Rfl: 2  •  docusate calcium (SURFAK) 240 MG capsule, Take 1 capsule by  mouth daily for constipation, Disp: 30 capsule, Rfl: 2  •  docusate calcium (SURFAK) 240 MG capsule, Take 1 capsule by mouth daily; constipation, Disp: 30 capsule, Rfl: 2  •  docusate calcium (SURFAK) 240 MG capsule, Take 1 capsule by mouth daily; constipation, Disp: 30 capsule, Rfl: 2  •  docusate calcium (Surfak) 240 MG capsule, Take 1 capsule by mouth daily; constipation, Disp: 30 capsule, Rfl: 2  •  docusate calcium (Surfak) 240 MG capsule, Take 1 capsule by mouth daily; constipation, Disp: 30 capsule, Rfl: 2  •  docusate calcium (Surfak) 240 MG capsule, Take 1 capsule by mouth daily for constipation., Disp: 30 capsule, Rfl: 2  •  EPINEPHrine (EPIPEN) 0.3 MG/0.3ML solution auto-injector injection, Use as direced., Disp: 2 each, Rfl: 3  •  EPINEPHrine (EPIPEN) 0.3 MG/0.3ML solution auto-injector injection, Inject 1 pen injector intramuscularly as directed to be used for anaphylaxis only, Disp: 2 each, Rfl: 2  •  escitalopram (LEXAPRO) 10 MG tablet, Take 1 tablet by mouth every night at bedtime., Disp: 30 tablet, Rfl: 2  •  escitalopram (LEXAPRO) 10 MG tablet, Take 1 tablet by mouth Every Night., Disp: 30 tablet, Rfl: 2  •  escitalopram (Lexapro) 20 MG tablet, Take 1 tablet by mouth daily, Disp: 30 tablet, Rfl: 2  •  escitalopram (Lexapro) 20 MG tablet, Take 1 tablet by mouth Daily., Disp: 30 tablet, Rfl: 2  •  ferrous sulfate (KP Ferrous Sulfate) 325 (65 FE) MG tablet, Take 1 tablet by mouth twice a day, Disp: 60 tablet, Rfl: 2  •  ferrous sulfate (KP Ferrous Sulfate) 325 (65 FE) MG tablet, Take 1 tablet by mouth twice a day, Disp: 60 tablet, Rfl: 2  •  ferrous sulfate (KP Ferrous Sulfate) 325 (65 FE) MG tablet, Take 1 tablet by mouth 2 (Two) Times a Day., Disp: 60 tablet, Rfl: 2  •  ferrous sulfate 325 (65 FE) MG tablet, Take 1 tablet by mouth 2 (Two) Times a Day., Disp: 60 tablet, Rfl: 2  •  ferrous sulfate 325 (65 FE) MG tablet, Take 1 tablet by mouth 2 (Two) Times a Day., Disp: 60 tablet, Rfl: 2  •  ferrous  sulfate 325 (65 FE) MG tablet, Take 1 tablet by mouth 2 (Two) Times a Day., Disp: 60 tablet, Rfl: 2  •  ferrous sulfate 325 (65 FE) MG tablet, Take 1 tablet by mouth twice a day, Disp: 60 tablet, Rfl: 2  •  ferrous sulfate 325 (65 FE) MG tablet, Take 1 tablet by mouth twice a day, Disp: 60 tablet, Rfl: 2  •  fluticasone (FLONASE) 50 MCG/ACT nasal spray, USE 2 SPRAYS IN EACH NOSTRIL DAILY, Disp: 16 g, Rfl: 5  •  fluticasone (FLONASE) 50 MCG/ACT nasal spray, Use 2 sprays in each nostril once daily, Disp: 16 g, Rfl: 5  •  ibuprofen (ADVIL,MOTRIN) 800 MG tablet, Take 1 tablet by mouth 2 to 3 Times a Day. (Patient taking differently: Take 800 mg by mouth 3 (Three) Times a Day. Patient takes PRN), Disp: 90 tablet, Rfl: 1  •  Insulin Pen Needle 32G X 4 MM misc, Use to inject Saxenda daily as directed, Disp: 100 each, Rfl: 0  •  ipratropium (ATROVENT) 0.06 % nasal spray, USE 2 SPRAYS IN EACH NOSTRIL 3 TIMES DAILY AS NEEDED FOR NASAL CONGESTION, Disp: 15 mL, Rfl: 5  •  ipratropium (ATROVENT) 0.06 % nasal spray, Use 2 sprays in each nostril 3 times daily, Disp: 15 mL, Rfl: 5  •  levothyroxine (Synthroid) 25 MCG tablet, Take 1 tablet by mouth daily; thyroid, Disp: 30 tablet, Rfl: 2  •  levothyroxine (Synthroid) 25 MCG tablet, Take 1 tablet by mouth daily for thyroid., Disp: 30 tablet, Rfl: 2  •  levothyroxine (Synthroid) 25 MCG tablet, Take 1 tablet by mouth daily for thyroid., Disp: 30 tablet, Rfl: 2  •  levothyroxine (SYNTHROID, LEVOTHROID) 25 MCG tablet, Take 1 tablet by mouth Daily for thyroid, Disp: 30 tablet, Rfl: 2  •  levothyroxine (SYNTHROID, LEVOTHROID) 25 MCG tablet, Take 1 tablet by mouth daily for thyroid, Disp: 30 tablet, Rfl: 2  •  Liraglutide (Saxenda) 18 MG/3ML injection pen, Inject 0.6 mg under the skin into the appropriate area as directed Daily for 1 week. Then, increase by 0.6 mg daily at weekly intervals to a max dose of 3 mg/day., Disp: 15 mL, Rfl: 0  •  metFORMIN ER (GLUCOPHAGE-XR) 500 MG 24 hr tablet,  "Take 2 tablets by mouth nightly., Disp: 60 tablet, Rfl: 2  •  Progesterone (Prometrium) 200 MG capsule, Take 1 capsule by mouth 3 times weekly, Disp: 13 capsule, Rfl: 2  •  Progesterone (Prometrium) 200 MG capsule, Take 1 capsule by mouth Every Night., Disp: 30 capsule, Rfl: 2  •  Progesterone (Prometrium) 200 MG capsule, Take 1 capsule by mouth nightly, Disp: 30 capsule, Rfl: 2  •  vitamin D (ERGOCALCIFEROL) 1.25 MG (36877 UT) capsule capsule, Take 1 capsule by mouth 1 (One) Time Per Week with meal., Disp: 4 capsule, Rfl: 2  •  vitamin D (ERGOCALCIFEROL) 1.25 MG (87879 UT) capsule capsule, Take 1 capsule by mouth weekly; take with meals, Disp: 4 capsule, Rfl: 2  •  vitamin D (ERGOCALCIFEROL) 1.25 MG (03019 UT) capsule capsule, Take 1 capsule by mouth weekly; take with meals, Disp: 4 capsule, Rfl: 2  •  vitamin D (ERGOCALCIFEROL) 1.25 MG (00246 UT) capsule capsule, Take 1 capsule by mouth weekly; take with meals, Disp: 4 capsule, Rfl: 2  •  vitamin D (ERGOCALCIFEROL) 1.25 MG (85866 UT) capsule capsule, Take 1 capsule by mouth weekly; take with meals, Disp: 4 capsule, Rfl: 2  •  vitamin D (ERGOCALCIFEROL) 1.25 MG (45854 UT) capsule capsule, Take 1 capsule by mouth weekly (take with meals)., Disp: 4 capsule, Rfl: 2    Drug Interactions  Saxenda+ Lexapro: May increase hypoglycemic effects  Saxenda + Prometrium: May decrease effects of Saxenda     Relevant Laboratory Values  Lab Results   Component Value Date    CHOL 181 09/05/2017    TRIG 173 (H) 09/05/2017    HDL 56 (L) 09/05/2017    LDL 90 09/05/2017     There is no height or weight on file to calculate BMI.    Vaccinations:   Patient recommended to keep up with routine vaccinations.     Patient Education      Goals of Therapy  • Clinical Goals or Therapeutic Targets: 10% weight loss goal      Date 9/8/22 10/6/22      Weight (lb) 235.4 lbs 224.6 lbs      BMI kg/ 43.05 41.07      Waist Circumference (in) 47\" 46.75''          Medication Assessment & Plan    Patient's " current BMI is 41.07, which is considered Class III Obesity. She has lost 10.8 lbs since starting the Saxenda, congratulated her on her success.    Due to the adverse effect with injection site reaction, recommended patient to hold the dose until meeting with referring provider Ben Jaimes. Messaged Ben Jaimes through chat and awaiting response. Set up an appointment for patient with Ben was 10/10 1:30pm.    Discussed lifestyle modifications, including diet and exercise.  Patient education provided.     Worked with patient to create SMART Goal(s):   1. Increase water intake 64 oz per day.  2. Decrease soda intake from 4 cans of soda per day to 2 per day.         Crystal Paul RPH  10/6/2022  08:48 EDT

## 2022-10-10 ENCOUNTER — TELEMEDICINE (OUTPATIENT)
Dept: FAMILY MEDICINE CLINIC | Age: 47
End: 2022-10-10

## 2022-11-02 ENCOUNTER — TRANSCRIBE ORDERS (OUTPATIENT)
Dept: ADMINISTRATIVE | Facility: HOSPITAL | Age: 47
End: 2022-11-02

## 2022-11-02 DIAGNOSIS — N92.4 EXCESSIVE BLEEDING IN PREMENOPAUSAL PERIOD: Primary | ICD-10-CM

## 2022-11-07 ENCOUNTER — HOSPITAL ENCOUNTER (OUTPATIENT)
Dept: MAMMOGRAPHY | Facility: HOSPITAL | Age: 47
Discharge: HOME | End: 2022-11-07
Admitting: NURSE PRACTITIONER

## 2022-11-07 DIAGNOSIS — Z12.31 VISIT FOR SCREENING MAMMOGRAM: ICD-10-CM

## 2022-11-07 PROCEDURE — 77063 BREAST TOMOSYNTHESIS BI: CPT

## 2022-11-07 PROCEDURE — 77067 SCR MAMMO BI INCL CAD: CPT | Performed by: RADIOLOGY

## 2022-11-07 PROCEDURE — 77063 BREAST TOMOSYNTHESIS BI: CPT | Performed by: RADIOLOGY

## 2022-11-07 PROCEDURE — 77067 SCR MAMMO BI INCL CAD: CPT

## 2022-11-11 ENCOUNTER — DISEASE STATE MANAGEMENT VISIT (OUTPATIENT)
Dept: PHARMACY | Facility: HOSPITAL | Age: 47
End: 2022-11-11

## 2022-11-11 VITALS — HEIGHT: 62 IN | BODY MASS INDEX: 40.85 KG/M2 | WEIGHT: 222 LBS

## 2022-11-11 RX ORDER — PEN NEEDLE, DIABETIC 30 GX3/16"
1 NEEDLE, DISPOSABLE MISCELLANEOUS DAILY
Qty: 100 EACH | Refills: 0 | Status: SHIPPED | OUTPATIENT
Start: 2022-11-11

## 2022-11-11 NOTE — PROGRESS NOTES
[facility]  Weight Management Program        Malgorzata Jimenez is a 47 y.o. female referred to the [facility] by Ben Jaimes for clinical pharmacy and specialty pharmacy management of Saxenda for weight management.  Malgorzata Jimenez has previously tried keto diet, weight watchers, Adipex and lifestyle changes for weight loss.  Current weight loss efforts include lifestyle changes watching what she eats and Saxenda. The patient denies a personal history or family history of thyroid cancer and denies a personal history of pancreatitis. Patient denies lumps/hoarseness/swelling of throat or severe abdominal pain.     Patient denies any GI side effects and has not taken since Sunday because she ran out. She reports to be tolerating the medication well up until she reached the 2.4 mg dose and then started having an injection site reaction. Patient reports to have itching at the injection site area and has tried to switch the areas however it is still producing itching/redness in the area. She has tried to decrease the dose 1.8mg and 1.2mg however she is still having the same reaction but it was not as bad at the 2.4 mg. She denies decrease in quality of life and has been using the benadryl spray or cortisone cream to manage the side effect and redness/itching disappears in 2-3 days. Patient stated she had appointment with Ben Jaimes regarding the injection site reactions. Patient stated that Ben was fine with her continuing medication as long as she does not experience swelling of the lips, tongue, throat and that using Benadryl oral or spray is fine for the injection site reaction. Patient has had issues with constipation prior to starting injection and is using stool softener, she does not feel starting Saxenda has not worsened it.    Patient has met her goal of reducing soda intake, she now only drinks 1 can of soda a day or less and switched to drinking sprite zero  sugar.     Relevant Past Medical History and Co-morbidities  Past Medical History:   Diagnosis Date   • Allergic rhinitis due to pollen    • Deviated septum      Social History     Socioeconomic History   • Marital status:    Tobacco Use   • Smoking status: Never   • Smokeless tobacco: Never   Substance and Sexual Activity   • Alcohol use: No   • Drug use: No   • Sexual activity: Defer       Allergies  Penicillins    Current Medication List    Current Outpatient Medications:   •  butalbital-acetaminophen-caffeine (FIORICET, ESGIC) -40 MG per tablet, Take 1 tablet by mouth Every 6 to 8 Hours for headaches., Disp: 20 tablet, Rfl: 0  •  docusate calcium (SURFAK) 240 MG capsule, Take 1 capsule by mouth Daily., Disp: 30 capsule, Rfl: 2  •  docusate calcium (SURFAK) 240 MG capsule, Take 1 capsule by mouth daily for constipation, Disp: 30 capsule, Rfl: 2  •  docusate calcium (SURFAK) 240 MG capsule, Take 1 capsule by mouth daily; constipation, Disp: 30 capsule, Rfl: 2  •  docusate calcium (SURFAK) 240 MG capsule, Take 1 capsule by mouth daily; constipation, Disp: 30 capsule, Rfl: 2  •  docusate calcium (Surfak) 240 MG capsule, Take 1 capsule by mouth daily; constipation, Disp: 30 capsule, Rfl: 2  •  docusate calcium (Surfak) 240 MG capsule, Take 1 capsule by mouth daily; constipation, Disp: 30 capsule, Rfl: 2  •  docusate calcium (Surfak) 240 MG capsule, Take 1 capsule by mouth daily for constipation., Disp: 30 capsule, Rfl: 2  •  EPINEPHrine (EPIPEN) 0.3 MG/0.3ML solution auto-injector injection, Use as direced., Disp: 2 each, Rfl: 3  •  EPINEPHrine (EPIPEN) 0.3 MG/0.3ML solution auto-injector injection, Inject 1 pen injector intramuscularly as directed to be used for anaphylaxis only, Disp: 2 each, Rfl: 2  •  escitalopram (LEXAPRO) 10 MG tablet, Take 1 tablet by mouth every night at bedtime., Disp: 30 tablet, Rfl: 2  •  escitalopram (LEXAPRO) 10 MG tablet, Take 1 tablet by mouth Every Night., Disp: 30  tablet, Rfl: 2  •  escitalopram (Lexapro) 20 MG tablet, Take 1 tablet by mouth daily, Disp: 30 tablet, Rfl: 2  •  escitalopram (Lexapro) 20 MG tablet, Take 1 tablet by mouth Daily., Disp: 30 tablet, Rfl: 2  •  ferrous sulfate (KP Ferrous Sulfate) 325 (65 FE) MG tablet, Take 1 tablet by mouth twice a day, Disp: 60 tablet, Rfl: 2  •  ferrous sulfate (KP Ferrous Sulfate) 325 (65 FE) MG tablet, Take 1 tablet by mouth twice a day, Disp: 60 tablet, Rfl: 2  •  ferrous sulfate (KP Ferrous Sulfate) 325 (65 FE) MG tablet, Take 1 tablet by mouth 2 (Two) Times a Day., Disp: 60 tablet, Rfl: 2  •  ferrous sulfate 325 (65 FE) MG tablet, Take 1 tablet by mouth 2 (Two) Times a Day., Disp: 60 tablet, Rfl: 2  •  ferrous sulfate 325 (65 FE) MG tablet, Take 1 tablet by mouth 2 (Two) Times a Day., Disp: 60 tablet, Rfl: 2  •  ferrous sulfate 325 (65 FE) MG tablet, Take 1 tablet by mouth 2 (Two) Times a Day., Disp: 60 tablet, Rfl: 2  •  ferrous sulfate 325 (65 FE) MG tablet, Take 1 tablet by mouth twice a day, Disp: 60 tablet, Rfl: 2  •  ferrous sulfate 325 (65 FE) MG tablet, Take 1 tablet by mouth twice a day, Disp: 60 tablet, Rfl: 2  •  fluticasone (FLONASE) 50 MCG/ACT nasal spray, USE 2 SPRAYS IN EACH NOSTRIL DAILY, Disp: 16 g, Rfl: 5  •  fluticasone (FLONASE) 50 MCG/ACT nasal spray, Use 2 sprays in each nostril once daily, Disp: 16 g, Rfl: 5  •  ibuprofen (ADVIL,MOTRIN) 800 MG tablet, Take 1 tablet by mouth 2 to 3 Times a Day. (Patient taking differently: Take 800 mg by mouth 3 (Three) Times a Day. Patient takes PRN), Disp: 90 tablet, Rfl: 1  •  Insulin Pen Needle 32G X 4 MM misc, Use to inject Saxenda daily as directed, Disp: 100 each, Rfl: 0  •  ipratropium (ATROVENT) 0.06 % nasal spray, USE 2 SPRAYS IN EACH NOSTRIL 3 TIMES DAILY AS NEEDED FOR NASAL CONGESTION, Disp: 15 mL, Rfl: 5  •  ipratropium (ATROVENT) 0.06 % nasal spray, Use 2 sprays in each nostril 3 times daily, Disp: 15 mL, Rfl: 5  •  levothyroxine (Synthroid) 25 MCG tablet,  Take 1 tablet by mouth daily; thyroid, Disp: 30 tablet, Rfl: 2  •  levothyroxine (Synthroid) 25 MCG tablet, Take 1 tablet by mouth daily for thyroid., Disp: 30 tablet, Rfl: 2  •  levothyroxine (Synthroid) 25 MCG tablet, Take 1 tablet by mouth daily for thyroid., Disp: 30 tablet, Rfl: 2  •  levothyroxine (SYNTHROID, LEVOTHROID) 25 MCG tablet, Take 1 tablet by mouth Daily for thyroid, Disp: 30 tablet, Rfl: 2  •  levothyroxine (SYNTHROID, LEVOTHROID) 25 MCG tablet, Take 1 tablet by mouth daily for thyroid, Disp: 30 tablet, Rfl: 2  •  Liraglutide (Saxenda) 18 MG/3ML injection pen, Inject 0.6 mg under the skin into the appropriate area as directed Daily for 1 week. Then, increase by 0.6 mg daily at weekly intervals to a max dose of 3 mg/day., Disp: 15 mL, Rfl: 0  •  metFORMIN ER (GLUCOPHAGE-XR) 500 MG 24 hr tablet, Take 2 tablets by mouth nightly., Disp: 60 tablet, Rfl: 2  •  Progesterone (Prometrium) 200 MG capsule, Take 1 capsule by mouth 3 times weekly, Disp: 13 capsule, Rfl: 2  •  Progesterone (Prometrium) 200 MG capsule, Take 1 capsule by mouth Every Night., Disp: 30 capsule, Rfl: 2  •  Progesterone (Prometrium) 200 MG capsule, Take 1 capsule by mouth nightly, Disp: 30 capsule, Rfl: 2  •  vitamin D (ERGOCALCIFEROL) 1.25 MG (24768 UT) capsule capsule, Take 1 capsule by mouth 1 (One) Time Per Week with meal., Disp: 4 capsule, Rfl: 2  •  vitamin D (ERGOCALCIFEROL) 1.25 MG (62983 UT) capsule capsule, Take 1 capsule by mouth weekly; take with meals, Disp: 4 capsule, Rfl: 2  •  vitamin D (ERGOCALCIFEROL) 1.25 MG (07492 UT) capsule capsule, Take 1 capsule by mouth weekly; take with meals, Disp: 4 capsule, Rfl: 2  •  vitamin D (ERGOCALCIFEROL) 1.25 MG (12650 UT) capsule capsule, Take 1 capsule by mouth weekly; take with meals, Disp: 4 capsule, Rfl: 2  •  vitamin D (ERGOCALCIFEROL) 1.25 MG (86522 UT) capsule capsule, Take 1 capsule by mouth weekly; take with meals, Disp: 4 capsule, Rfl: 2  •  vitamin D (ERGOCALCIFEROL) 1.25  "MG (74129 UT) capsule capsule, Take 1 capsule by mouth weekly (take with meals)., Disp: 4 capsule, Rfl: 2    Drug Interactions  Saxenda+ Lexapro: May increase hypoglycemic effects  Saxenda + Prometrium: May decrease effects of Saxenda     Relevant Laboratory Values  Lab Results   Component Value Date    CHOL 181 09/05/2017    TRIG 173 (H) 09/05/2017    HDL 56 (L) 09/05/2017    LDL 90 09/05/2017     Body mass index is 40.6 kg/m².    Vaccinations:   Patient recommended to keep up with routine vaccinations.       Goals of Therapy  • Clinical Goals or Therapeutic Targets: 10% weight loss goal      Date 9/8/22 10/6/22 11/11/22     Weight (lb) 235.4 lbs 224.6 lbs 222 lbs     BMI kg/ 43.05 41.07 40.6     Waist Circumference (in) 47\" 46.75'' 43.5\"         Medication Assessment & Plan    Patient's current BMI is 40.6, which is considered Class III Obesity. She has lost 13.4 lbs and 3.5 inches off her waist since starting the Saxenda, congratulated her on her success.    Will reorder Saxenda. Advised patient to start Saxenda back at low dose to prevent GI upset since she has not had a dose since Sunday (11/6/22).  Patient needs refills on pen needles. Will re-order pen needles and note to dispense a different brand than previously to see if it helps with injection site reactions. Reevaluate at next visit.     Discussed lifestyle modifications, including diet and exercise.  Patient education provided.     Will route this note to referring provider to ensure she is agreeable to continue therapy with mild injection site reaction.    Worked with patient to create SMART Goal(s):   1. Increase water intake 64 oz per day. - patient would like to continue to reach this goal.   2. Decrease soda intake from 4 cans of soda per day to 2 per day. - patient has met this goal, she only drinks 1 can of soda a day.       Reyna Swan, [facility] [place]  11/11/2022  09:00 EST  "

## 2022-11-30 ENCOUNTER — HOSPITAL ENCOUNTER (OUTPATIENT)
Dept: ULTRASOUND IMAGING | Facility: HOSPITAL | Age: 47
Discharge: HOME | End: 2022-11-30
Admitting: NURSE PRACTITIONER

## 2022-11-30 DIAGNOSIS — N92.4 EXCESSIVE BLEEDING IN PREMENOPAUSAL PERIOD: ICD-10-CM

## 2022-11-30 PROCEDURE — 76830 TRANSVAGINAL US NON-OB: CPT

## 2022-11-30 PROCEDURE — 76830 TRANSVAGINAL US NON-OB: CPT | Performed by: RADIOLOGY

## 2022-11-30 PROCEDURE — 76856 US EXAM PELVIC COMPLETE: CPT

## 2022-11-30 PROCEDURE — 76856 US EXAM PELVIC COMPLETE: CPT | Performed by: RADIOLOGY

## 2022-12-12 ENCOUNTER — DISEASE STATE MANAGEMENT VISIT (OUTPATIENT)
Dept: PHARMACY | Facility: HOSPITAL | Age: 47
End: 2022-12-12

## 2022-12-12 VITALS
DIASTOLIC BLOOD PRESSURE: 76 MMHG | HEIGHT: 62 IN | HEART RATE: 71 BPM | SYSTOLIC BLOOD PRESSURE: 118 MMHG | WEIGHT: 217.6 LBS | BODY MASS INDEX: 40.04 KG/M2

## 2022-12-12 DIAGNOSIS — E66.2 CLASS 2 OBESITY WITH ALVEOLAR HYPOVENTILATION AND BODY MASS INDEX (BMI) OF 39.0 TO 39.9 IN ADULT, UNSPECIFIED WHETHER SERIOUS COMORBIDITY PRESENT: Primary | ICD-10-CM

## 2022-12-12 NOTE — PROGRESS NOTES
[facility] Clinic  Weight Management Program        Malgorzata Jimenez is a 47 y.o. female referred to the [facility] by Ben Jaimes for clinical pharmacy and specialty pharmacy management of Saxenda for weight management.  Malgorzata Jimenez has previously tried keto diet, weight watchers, Adipex and lifestyle changes for weight loss.  Current weight loss efforts include lifestyle changes watching what she eats and Saxenda 2.4mg daily. The patient denies a personal history or family history of thyroid cancer and denies a personal history of pancreatitis. Patient denies lumps/hoarseness/swelling of throat or severe abdominal pain.     She denies any side effects to the medication, denies missing any doses and reports that she is no longer having any itching after injections, only some occasional, mild redness.     She has decreased her soda to 1/day and is getting about 40 oz of water each day.     Relevant Past Medical History and Co-morbidities  Past Medical History:   Diagnosis Date   • Allergic rhinitis due to pollen    • Deviated septum      Social History     Socioeconomic History   • Marital status:    Tobacco Use   • Smoking status: Never   • Smokeless tobacco: Never   Substance and Sexual Activity   • Alcohol use: No   • Drug use: No   • Sexual activity: Defer       Allergies  Penicillins    Current Medication List    Current Outpatient Medications:   •  docusate calcium (Surfak) 240 MG capsule, Take 1 capsule by mouth daily for constipation., Disp: 30 capsule, Rfl: 2  •  EPINEPHrine (EPIPEN) 0.3 MG/0.3ML solution auto-injector injection, Inject 1 pen injector intramuscularly as directed to be used for anaphylaxis only, Disp: 2 each, Rfl: 2  •  escitalopram (Lexapro) 20 MG tablet, Take 1 tablet by mouth Daily., Disp: 30 tablet, Rfl: 2  •  ferrous sulfate 325 (65 FE) MG tablet, Take 1 tablet by mouth twice a day, Disp: 60 tablet, Rfl: 2  •  fluticasone (FLONASE) 50  MCG/ACT nasal spray, Use 2 sprays in each nostril once daily, Disp: 16 g, Rfl: 5  •  ibuprofen (ADVIL,MOTRIN) 800 MG tablet, Take 1 tablet by mouth 2 to 3 Times a Day. (Patient taking differently: Take 800 mg by mouth 3 (Three) Times a Day. Patient takes PRN), Disp: 90 tablet, Rfl: 1  •  Insulin Pen Needle (Pen Needles) 32G X 4 MM misc, Use to inject Saxenda once daily, Disp: 100 each, Rfl: 0  •  ipratropium (ATROVENT) 0.06 % nasal spray, Use 2 sprays in each nostril 3 times daily, Disp: 15 mL, Rfl: 5  •  Liraglutide (SAXENDA) 18 MG/3ML injection pen, Inject 0.6 mg under the skin into the appropriate area as directed Daily for 7 days, THEN 1.2 mg Daily for 7 days, THEN 1.8 mg Daily for 7 days, THEN 2.4 mg Daily for 7 days, THEN 3 mg Daily for 30 days., Disp: 15 mL, Rfl: 0  •  metFORMIN ER (GLUCOPHAGE-XR) 500 MG 24 hr tablet, Take 2 tablets by mouth nightly, Disp: 60 tablet, Rfl: 2  •  Progesterone (Prometrium) 200 MG capsule, Take 1 capsule by mouth 2 times weekly, Disp: 9 capsule, Rfl: 2  •  vitamin D (ERGOCALCIFEROL) 1.25 MG (38472 UT) capsule capsule, Take 1 capsule by mouth weekly; take with meals, Disp: 4 capsule, Rfl: 2  •  butalbital-acetaminophen-caffeine (FIORICET, ESGIC) -40 MG per tablet, Take 1 tablet by mouth every 6-8 hours for headaches, Disp: 30 tablet, Rfl: 0  •  ferrous sulfate (KP Ferrous Sulfate) 325 (65 FE) MG tablet, Take 1 tablet by mouth twice a day, Disp: 60 tablet, Rfl: 2  •  ferrous sulfate (KP Ferrous Sulfate) 325 (65 FE) MG tablet, Take 1 tablet by mouth twice a day, Disp: 60 tablet, Rfl: 2  •  ferrous sulfate (KP Ferrous Sulfate) 325 (65 FE) MG tablet, Take 1 tablet by mouth 2 (Two) Times a Day., Disp: 60 tablet, Rfl: 2  •  levothyroxine (Synthroid) 25 MCG tablet, Take 1 tablet by mouth daily; thyroid, Disp: 30 tablet, Rfl: 2    Drug Interactions  Saxenda+ Lexapro: May increase hypoglycemic effects  Saxenda + Prometrium: May decrease effects of Saxenda     Relevant Laboratory  "Values  Lab Results   Component Value Date    CHOL 181 09/05/2017    TRIG 173 (H) 09/05/2017    HDL 56 (L) 09/05/2017    LDL 90 09/05/2017     There is no height or weight on file to calculate BMI.    Vaccinations:   Patient recommended to keep up with routine vaccinations.       Goals of Therapy  • Clinical Goals or Therapeutic Targets: 10% weight loss goal      Date 9/8/22 10/6/22 11/11/22 12/12/22    Weight (lb) 235.4 lbs 224.6 lbs 222 lbs 217.6 lb    BMI kg/ 43.05 41.07 40.6 39.8    Waist Circumference (in) 47\" 46.75'' 43.5\" 48 in.         Medication Assessment & Plan    Patient's current BMI is 39.8, which is considered Class II Obesity. She has lost 18 lbs since starting the Saxenda, congratulated her on her success.    Will reorder Saxenda.     Discussed lifestyle modifications, including diet and exercise.  Patient education provided.     Worked with patient to create SMART Goal(s):   1. Increase water intake 64 oz per day.   2. Continue to limit soda to no more than 1/day.    Patient will need labs prior to next visit.  Placed order for lipid panel, thyroid panel, CMP and A1c.     Patient will need follow-up with provider prior to next visit.     Will follow-up in 1 month.     Sandy Chang, PharmD  12/12/2022  14:43 EST  "

## 2022-12-13 NOTE — PROGRESS NOTES
As of 12/13/22: New referral from Radha Nielsen as referring provider.     Thank you,    Crystal Ball. Best, PharmD  12/13/22  16:28 EST

## 2022-12-22 ENCOUNTER — TRANSCRIBE ORDERS (OUTPATIENT)
Dept: ADMINISTRATIVE | Facility: HOSPITAL | Age: 47
End: 2022-12-22

## 2022-12-22 ENCOUNTER — LAB (OUTPATIENT)
Dept: LAB | Facility: HOSPITAL | Age: 47
End: 2022-12-22

## 2022-12-22 DIAGNOSIS — E03.9 HYPOTHYROIDISM, UNSPECIFIED TYPE: ICD-10-CM

## 2022-12-22 DIAGNOSIS — D50.9 IRON DEFICIENCY ANEMIA, UNSPECIFIED IRON DEFICIENCY ANEMIA TYPE: ICD-10-CM

## 2022-12-22 DIAGNOSIS — N83.202 UNSPECIFIED OVARIAN CYST, LEFT SIDE: ICD-10-CM

## 2022-12-22 DIAGNOSIS — E88.81 METABOLIC SYNDROME: ICD-10-CM

## 2022-12-22 LAB
ALBUMIN SERPL-MCNC: 4.3 G/DL (ref 3.5–5.2)
ALBUMIN/GLOB SERPL: 1.9 G/DL
ALP SERPL-CCNC: 70 U/L (ref 39–117)
ALT SERPL W P-5'-P-CCNC: 29 U/L (ref 1–33)
ANION GAP SERPL CALCULATED.3IONS-SCNC: 11.7 MMOL/L (ref 5–15)
AST SERPL-CCNC: 21 U/L (ref 1–32)
BASOPHILS # BLD AUTO: 0.03 10*3/MM3 (ref 0–0.2)
BASOPHILS NFR BLD AUTO: 0.4 % (ref 0–1.5)
BILIRUB SERPL-MCNC: <0.2 MG/DL (ref 0–1.2)
BUN SERPL-MCNC: 15 MG/DL (ref 6–20)
BUN/CREAT SERPL: 15.8 (ref 7–25)
CALCIUM SPEC-SCNC: 9.2 MG/DL (ref 8.6–10.5)
CANCER AG125 SERPL QL: 7.3 U/ML (ref 0–38.1)
CHLORIDE SERPL-SCNC: 100 MMOL/L (ref 98–107)
CHOLEST SERPL-MCNC: 159 MG/DL (ref 0–200)
CO2 SERPL-SCNC: 24.3 MMOL/L (ref 22–29)
CREAT SERPL-MCNC: 0.95 MG/DL (ref 0.57–1)
DEPRECATED RDW RBC AUTO: 39.4 FL (ref 37–54)
EGFRCR SERPLBLD CKD-EPI 2021: 73.6 ML/MIN/1.73
EOSINOPHIL # BLD AUTO: 0.21 10*3/MM3 (ref 0–0.4)
EOSINOPHIL NFR BLD AUTO: 2.5 % (ref 0.3–6.2)
ERYTHROCYTE [DISTWIDTH] IN BLOOD BY AUTOMATED COUNT: 11.8 % (ref 12.3–15.4)
ESTRADIOL SERPL HS-MCNC: 236 PG/ML
FERRITIN SERPL-MCNC: 28.7 NG/ML (ref 13–150)
GLOBULIN UR ELPH-MCNC: 2.3 GM/DL
GLUCOSE SERPL-MCNC: 80 MG/DL (ref 65–99)
HBA1C MFR BLD: 5.1 % (ref 4.8–5.6)
HCT VFR BLD AUTO: 41.6 % (ref 34–46.6)
HDLC SERPL-MCNC: 39 MG/DL (ref 40–60)
HGB BLD-MCNC: 13.6 G/DL (ref 12–15.9)
IMM GRANULOCYTES # BLD AUTO: 0.02 10*3/MM3 (ref 0–0.05)
IMM GRANULOCYTES NFR BLD AUTO: 0.2 % (ref 0–0.5)
LDLC SERPL CALC-MCNC: 87 MG/DL (ref 0–100)
LDLC/HDLC SERPL: 2.1 {RATIO}
LYMPHOCYTES # BLD AUTO: 3.34 10*3/MM3 (ref 0.7–3.1)
LYMPHOCYTES NFR BLD AUTO: 39 % (ref 19.6–45.3)
MCH RBC QN AUTO: 30.2 PG (ref 26.6–33)
MCHC RBC AUTO-ENTMCNC: 32.7 G/DL (ref 31.5–35.7)
MCV RBC AUTO: 92.2 FL (ref 79–97)
MONOCYTES # BLD AUTO: 0.36 10*3/MM3 (ref 0.1–0.9)
MONOCYTES NFR BLD AUTO: 4.2 % (ref 5–12)
NEUTROPHILS NFR BLD AUTO: 4.61 10*3/MM3 (ref 1.7–7)
NEUTROPHILS NFR BLD AUTO: 53.7 % (ref 42.7–76)
NRBC BLD AUTO-RTO: 0 /100 WBC (ref 0–0.2)
PLATELET # BLD AUTO: 269 10*3/MM3 (ref 140–450)
PMV BLD AUTO: 11.3 FL (ref 6–12)
POTASSIUM SERPL-SCNC: 4.4 MMOL/L (ref 3.5–5.2)
PROGEST SERPL-MCNC: 6 NG/ML
PROT SERPL-MCNC: 6.6 G/DL (ref 6–8.5)
RBC # BLD AUTO: 4.51 10*6/MM3 (ref 3.77–5.28)
SODIUM SERPL-SCNC: 136 MMOL/L (ref 136–145)
T3 SERPL-MCNC: 110 NG/DL (ref 80–200)
T4 SERPL-MCNC: 7.06 MCG/DL (ref 4.5–11.7)
TRIGL SERPL-MCNC: 191 MG/DL (ref 0–150)
TSH SERPL DL<=0.05 MIU/L-ACNC: 1.6 UIU/ML (ref 0.27–4.2)
VLDLC SERPL-MCNC: 33 MG/DL (ref 5–40)
WBC NRBC COR # BLD: 8.57 10*3/MM3 (ref 3.4–10.8)

## 2022-12-22 PROCEDURE — 82670 ASSAY OF TOTAL ESTRADIOL: CPT

## 2022-12-22 PROCEDURE — 83525 ASSAY OF INSULIN: CPT

## 2022-12-22 PROCEDURE — 84436 ASSAY OF TOTAL THYROXINE: CPT

## 2022-12-22 PROCEDURE — 82728 ASSAY OF FERRITIN: CPT

## 2022-12-22 PROCEDURE — 83036 HEMOGLOBIN GLYCOSYLATED A1C: CPT

## 2022-12-22 PROCEDURE — 36415 COLL VENOUS BLD VENIPUNCTURE: CPT

## 2022-12-22 PROCEDURE — 86304 IMMUNOASSAY TUMOR CA 125: CPT

## 2022-12-22 PROCEDURE — 80061 LIPID PANEL: CPT

## 2022-12-22 PROCEDURE — 80050 GENERAL HEALTH PANEL: CPT

## 2022-12-22 PROCEDURE — 84144 ASSAY OF PROGESTERONE: CPT

## 2022-12-22 PROCEDURE — 84480 ASSAY TRIIODOTHYRONINE (T3): CPT

## 2022-12-23 LAB — INSULIN SERPL-ACNC: 19.2 UIU/ML (ref 2.6–24.9)

## 2023-01-09 ENCOUNTER — DISEASE STATE MANAGEMENT VISIT (OUTPATIENT)
Dept: PHARMACY | Facility: HOSPITAL | Age: 48
End: 2023-01-09
Payer: COMMERCIAL

## 2023-01-09 VITALS
SYSTOLIC BLOOD PRESSURE: 108 MMHG | DIASTOLIC BLOOD PRESSURE: 73 MMHG | WEIGHT: 218 LBS | BODY MASS INDEX: 39.87 KG/M2 | HEART RATE: 83 BPM

## 2023-01-09 RX ORDER — SEMAGLUTIDE 1 MG/.5ML
1 INJECTION, SOLUTION SUBCUTANEOUS WEEKLY
Qty: 2 ML | Refills: 0 | Status: SHIPPED | OUTPATIENT
Start: 2023-01-09 | End: 2023-02-13

## 2023-01-09 NOTE — PROGRESS NOTES
[facility]  Weight Management Program      Malgorzata Jimenez is a 47 y.o. female referred to the [facility] by Radha Nielsen for clinical pharmacy and specialty pharmacy management of GLP1 for weight management.  Malgorzata Jimenez has previously tried keto diet, weight watchers, Adipex and lifestyle changes for weight loss.  Current weight loss efforts include lifestyle changes watching what she eats and Saxenda 3mg daily. The patient denies a personal history or family history of thyroid cancer and denies a personal history of pancreatitis. Patient denies lumps/hoarseness/swelling of throat or severe abdominal pain.     She denies any side effects to the medication, denies missing any doses and reports that she is no longer having any itching after injections.    She reports stopping soda on the 1st of this month.     Relevant Past Medical History and Co-morbidities  Past Medical History:   Diagnosis Date   • Allergic rhinitis due to pollen    • Deviated septum      Social History     Socioeconomic History   • Marital status:    Tobacco Use   • Smoking status: Never   • Smokeless tobacco: Never   Substance and Sexual Activity   • Alcohol use: No   • Drug use: No   • Sexual activity: Defer       Allergies  Penicillins    Current Medication List    Current Outpatient Medications:   •  butalbital-acetaminophen-caffeine (FIORICET, ESGIC) -40 MG per tablet, Take 1 tablet by mouth every 6-8 hours for headaches, Disp: 30 tablet, Rfl: 0  •  docusate calcium (Surfak) 240 MG capsule, Take 1 capsule by mouth daily for constipation., Disp: 30 capsule, Rfl: 2  •  EPINEPHrine (EPIPEN) 0.3 MG/0.3ML solution auto-injector injection, Inject 1 pen injector intramuscularly as directed to be used for anaphylaxis only, Disp: 2 each, Rfl: 2  •  escitalopram (Lexapro) 20 MG tablet, Take 1 tablet by mouth Daily., Disp: 30 tablet, Rfl: 2  •  fluticasone (FLONASE) 50 MCG/ACT nasal spray,  Use 2 sprays in each nostril once daily, Disp: 16 g, Rfl: 5  •  ibuprofen (ADVIL,MOTRIN) 800 MG tablet, Take 1 tablet by mouth 2 to 3 Times a Day. (Patient taking differently: Take 800 mg by mouth 3 (Three) Times a Day. Patient takes PRN), Disp: 90 tablet, Rfl: 1  •  Insulin Pen Needle (Pen Needles) 32G X 4 MM misc, Use to inject Saxenda once daily, Disp: 100 each, Rfl: 0  •  ipratropium (ATROVENT) 0.06 % nasal spray, Use 2 sprays in each nostril 3 times daily, Disp: 15 mL, Rfl: 5  •  levothyroxine (Synthroid) 25 MCG tablet, Take 1 tablet by mouth daily for thyroid., Disp: 30 tablet, Rfl: 2  •  Liraglutide (SAXENDA) 18 MG/3ML injection pen, Inject 3 mg under the skin Daily., Disp: 15 mL, Rfl: 0  •  metFORMIN ER (GLUCOPHAGE-XR) 500 MG 24 hr tablet, Take 2 tablets by mouth nightly, Disp: 60 tablet, Rfl: 2  •  Progesterone (Prometrium) 200 MG capsule, Take 1 capsule by mouth 2 times weekly, Disp: 9 capsule, Rfl: 2  •  vitamin D (ERGOCALCIFEROL) 1.25 MG (40526 UT) capsule capsule, Take 1 capsule by mouth weekly; take with meals, Disp: 4 capsule, Rfl: 2    Drug Interactions  Saxenda+ Lexapro: May increase hypoglycemic effects  Saxenda + Prometrium: May decrease effects of Saxenda     Relevant Laboratory Values  Lab Results   Component Value Date    CHOL 159 12/22/2022    TRIG 191 (H) 12/22/2022    HDL 39 (L) 12/22/2022    LDL 87 12/22/2022     Body mass index is 39.87 kg/m².    Vaccinations:   Patient recommended to keep up with routine vaccinations.       Goals of Therapy  • Clinical Goals or Therapeutic Targets: 10% weight loss goal      Date 9/8/22 10/6/22 11/11/22 12/12/22 1/9/23   Weight (lb) 235.4 lbs 224.6 lbs 222 lbs 217.6 lb 218lb   BMI kg/ 43.05 41.07 40.6 39.8 39.8   Waist Circumference (in) 47\" 46.75'' 43.5\" 48 in.  44.5 in       Medication Assessment & Plan    Patient's current BMI is 39.8, which is considered Class II Obesity. She has lost 18 lbs since starting the Saxenda, congratulated her on her  success.    Will reorder Wegovy 1mg SC weekly.  Medication education and injection training provided by Cori Wall, [employer].      Discussed lifestyle modifications, including diet and exercise.  Patient education provided.     Worked with patient to create SMART Goal(s):   1. Increase water intake 80 oz per day.   2. Continue to limit soda to no more than 0/day.    Patient will need follow-up with provider prior to next visit.     Will follow-up in 1 month.     Sandy Chang PharmD  1/9/2023  14:45 EST

## 2023-01-09 NOTE — PROGRESS NOTES
Wegovy® (semaglutide)  Medication Expectations   Why am I taking this medication? You are taking Wegovy for weight loss because you have excess weight and also have weight-related medical problems or obesity. It should be used along with a reduced calorie diet and increased physical activity.    What should I expect while on this medication? You should expect to lose at least 5% of your body weight after 3 months of therapy. It can also help keep weight lost off.    How does the medication work? Wegovy is an injection that addresses one of your body's natural responses to weight loss. It works like a naturally produced hormone in the body called GLP-1 that regulates appetite which can lead to eating fewer calories and losing weight. This medication also slows down food from leaving your stomach, making you feel english for longer.   How long will I be on this medication for? The amount of time you will be on this medication will be determined by your doctor. Do not abruptly stop this medication without talking to your doctor first.    How do I take this medication? Take as directed on your prescription label. Wegovy is injected under the skin (subcutaneously) of your stomach, thigh, or upper arm. This medication should be taken once weekly and can be given with or without food. Rotate injection sites weekly. If changing the day of administration is necessary, allow at least 48 hours between 2 doses.     After removal of the pen cap, the needles will be hidden inside the needle cover.  To begin injection, press the needle cover firmly against the skin.  Once injected, continue to press the device against the skin until the yellow bar has stopped moving.  Then, remove the needle from the skin.    What are some possible side effects? You may notice you don't feel as hungry, especially when you first start using Wegovy. Some common side effects include nausea, vomiting, diarrhea, vomiting, indigestion, constipation,  tiredness, and headache. Redness, itching, and/or swelling can occur where the shot was given. You should also monitor for low blood sugar (hypoglycemia), especially if you are taking Wegovy with other medications that cause low blood sugar. Stop using Wegovy and call your doctor immediately if you have severe pain in your stomach area that will not go away as this could be a sign of pancreatitis (inflammation of your pancreas).     What happens if I miss a dose? If you miss a dose, take it as soon as you remember within 5 days. Resume usual schedule thereafter.  If > 5 days have elapsed, skip the missed dose and resume administration at the next scheduled weekly dose.      Medication Safety   What are things I should warn my doctor immediately about? Do not use Wegovy if you or a family member have ever had medullary thyroid cancer (MTC) or Multiple Endocrine Neoplasia syndrome type 2 (MEN 2). Tell your doctor if you get a lump or swelling in your neck, hoarseness, difficulty swallowing, or feel short of breath (these may be symptoms of thyroid cancer). Talk to your doctor if you have or have had problems with your pancreas, kidneys, or liver. Tell your doctor if you have problems with digesting food or slowed emptying of your stomach (gastroparesis). Talk to your doctor if you are pregnant, planning to become pregnant, or breastfeeding. Also tell your doctor if you notice any signs/symptoms of an allergic reaction (rash, hives, difficulty breathing, etc.).   What are things that I should be cautious of? Be cautious of any side effects from this medication. Talk to your doctor if any new ones develop or aren't getting better.   What are some medications that can interact with this one? Taking Wegovy with other medications that lower your blood sugar such as insulins and glipizide/glimepiride/glyburide may increase the risk of low blood sugar. It should not be taken with other medicines called GLP-1 receptor  agonists, as these work the same way as Wegovy. Because Wegovy slows stomach emptying, it can affect the way some medicines work. Always tell your doctor or pharmacist immediately if you start taking any new medications, including over-the-counter medications, vitamins, and herbal supplements.      Medication Storage/Handling   How should I handle this medication? Keep this medication out of reach of pets/children and keep the pen capped when not in use. Do not share your medicine pens with others.    How does this medication need to be stored? Store your new, unused pens in the original carton in the refrigerator. Protect it from light. Do not freeze. Prior to cap removal, the pen can be kept at room temperature up to 28 days.    How should I dispose of this medication? Used Wegovy pens should be thrown after each use. Place your used pen and needle in an approved sharps container. If you do not have a sharps container, you may use a household container made of heavy-duty plastic with a tight-fitting lid that is leak resistant (e.g., heavy-duty plastic laundry detergent bottle). If your doctor decides to stop this medication, take to your local police station for proper disposal. Some pharmacies also have take-back bins for medication drop-off.       Resources/Support   How can I remind myself to take this medication? You can download reminder apps to help you manage your refills. You may also set an alarm on your phone to remind you.    Is financial support available?  [employer] can provide co-pay cards if you have commercial insurance.    Which vaccines are recommended for me? Talk to your doctor about these vaccines: Flu, Coronavirus (COVID-19), Pneumococcal (pneumonia), Tdap, Zoster (shingles)

## 2023-01-31 ENCOUNTER — SPECIALTY PHARMACY (OUTPATIENT)
Dept: PHARMACY | Facility: HOSPITAL | Age: 48
End: 2023-01-31
Payer: COMMERCIAL

## 2023-02-13 ENCOUNTER — DISEASE STATE MANAGEMENT VISIT (OUTPATIENT)
Dept: PHARMACY | Facility: HOSPITAL | Age: 48
End: 2023-02-13
Payer: COMMERCIAL

## 2023-02-13 VITALS
HEIGHT: 62 IN | BODY MASS INDEX: 39.45 KG/M2 | HEART RATE: 74 BPM | DIASTOLIC BLOOD PRESSURE: 73 MMHG | SYSTOLIC BLOOD PRESSURE: 112 MMHG | WEIGHT: 214.4 LBS

## 2023-02-13 RX ORDER — SEMAGLUTIDE 1.7 MG/.75ML
1.7 INJECTION, SOLUTION SUBCUTANEOUS WEEKLY
Qty: 3 ML | Refills: 0 | Status: SHIPPED | OUTPATIENT
Start: 2023-02-13 | End: 2023-03-13

## 2023-02-13 NOTE — PROGRESS NOTES
[facility]  [facility] Management Program      Malgorzata Jimenez is a 47 y.o. female referred [facility] by Radha Nielsen for clinical pharmacy and specialty pharmacy management of GLP1 for weight management.  Malgorzata Jimenez has previously tried keto diet, weight watchers, Adipex and lifestyle changes for weight loss.  Current weight loss efforts include lifestyle changes watching what she eats and Wegovy 1 mg daily. The patient denies a personal history or family history of thyroid cancer and denies a personal history of pancreatitis. Patient denies lumps/hoarseness/swelling of throat or severe abdominal pain.     She denies any side effects to the medication except for mild nausea, denies missing any doses and reports that she is no longer having any itching after injections.    She reports stopping soda on the 1st of January.    Relevant Past Medical History and Co-morbidities  Past Medical History:   Diagnosis Date   • Allergic rhinitis due to pollen    • Deviated septum      Social History     Socioeconomic History   • Marital status:    Tobacco Use   • Smoking status: Never   • Smokeless tobacco: Never   Substance and Sexual Activity   • Alcohol use: No   • Drug use: No   • Sexual activity: Defer       Allergies  Penicillins    Current Medication List    Current Outpatient Medications:   •  butalbital-acetaminophen-caffeine (FIORICET, ESGIC) -40 MG per tablet, Take 1 tablet by mouth every 6-8 hours for headaches, Disp: 30 tablet, Rfl: 0  •  docusate calcium (Surfak) 240 MG capsule, Take 1 capsule by mouth daily for constipation., Disp: 30 capsule, Rfl: 2  •  EPINEPHrine (EPIPEN) 0.3 MG/0.3ML solution auto-injector injection, Inject 1 pen injector intramuscularly as directed to be used for anaphylaxis only, Disp: 2 each, Rfl: 2  •  escitalopram (Lexapro) 20 MG tablet, Take 1 tablet by mouth Daily., Disp: 30 tablet, Rfl: 2  •  fluticasone (FLONASE) 50  "MCG/ACT nasal spray, Use 2 sprays in each nostril once daily, Disp: 16 g, Rfl: 5  •  ibuprofen (ADVIL,MOTRIN) 800 MG tablet, Take 1 tablet by mouth 2 to 3 Times a Day. (Patient taking differently: Take 800 mg by mouth 3 (Three) Times a Day. Patient takes PRN), Disp: 90 tablet, Rfl: 1  •  Insulin Pen Needle (Pen Needles) 32G X 4 MM misc, Use to inject Saxenda once daily, Disp: 100 each, Rfl: 0  •  ipratropium (ATROVENT) 0.06 % nasal spray, Use 2 sprays in each nostril 3 times daily, Disp: 15 mL, Rfl: 5  •  levothyroxine (Synthroid) 25 MCG tablet, Take 1 tablet by mouth daily for thyroid., Disp: 30 tablet, Rfl: 2  •  metFORMIN ER (GLUCOPHAGE-XR) 500 MG 24 hr tablet, Take 2 tablets by mouth nightly, Disp: 60 tablet, Rfl: 2  •  Progesterone (Prometrium) 200 MG capsule, Take 1 capsule by mouth 2 times weekly, Disp: 9 capsule, Rfl: 2  •  Semaglutide-Weight Management (Wegovy) 1 MG/0.5ML solution auto-injector, Inject 1 mg under the skin into the appropriate area as directed 1 (One) Time Per Week., Disp: 2 mL, Rfl: 0  •  vitamin D (ERGOCALCIFEROL) 1.25 MG (20282 UT) capsule capsule, Take 1 capsule by mouth weekly; take with meals, Disp: 4 capsule, Rfl: 2    Drug Interactions  Saxenda+ Lexapro: May increase hypoglycemic effects  Saxenda + Prometrium: May decrease effects of Saxenda     Relevant Laboratory Values  Lab Results   Component Value Date    CHOL 159 12/22/2022    TRIG 191 (H) 12/22/2022    HDL 39 (L) 12/22/2022    LDL 87 12/22/2022     There is no height or weight on file to calculate BMI.    Vaccinations:   Patient recommended to keep up with routine vaccinations.       Goals of Therapy  • Clinical Goals or Therapeutic Targets: 10% weight loss goal      Date 9/8/22 10/6/22 11/11/22 12/12/22 1/9/23 2/13/23   Weight (lb) 235.4 lbs 224.6 lbs 222 lbs 217.6 lb 218lb 214.4   BMI kg/ 43.05 41.07 40.6 39.8 39.8 39.21   Waist Circumference (in) 47\" 46.75'' 43.5\" 48 in.  44.5 in 44.5\"       Medication Assessment & " Plan    Patient's current BMI is 39.21, which is considered Class II Obesity. She has lost 21 lbs since starting the Saxenda and Wegovy, congratulated her on her success.    Will order Wegovy 1.7 mg SC weekly.        Discussed lifestyle modifications, including diet and exercise.  Patient education provided.     Worked with patient to create SMART Goal(s):   1. Increase water intake 80 oz per day.   2. Continue to limit soda to no more than 0/day.    Patient will need follow-up with provider prior to next visit.     Will follow-up in 1 month.     Demario Sandoval RPH  2/13/2023  14:40 EST

## 2023-03-13 ENCOUNTER — DISEASE STATE MANAGEMENT VISIT (OUTPATIENT)
Dept: PHARMACY | Facility: HOSPITAL | Age: 48
End: 2023-03-13
Payer: COMMERCIAL

## 2023-03-13 VITALS
HEART RATE: 71 BPM | SYSTOLIC BLOOD PRESSURE: 129 MMHG | BODY MASS INDEX: 39.67 KG/M2 | DIASTOLIC BLOOD PRESSURE: 88 MMHG | HEIGHT: 62 IN | WEIGHT: 215.6 LBS

## 2023-03-13 RX ORDER — SEMAGLUTIDE 2.4 MG/.75ML
2.4 INJECTION, SOLUTION SUBCUTANEOUS WEEKLY
Qty: 3 ML | Refills: 0 | Status: SHIPPED | OUTPATIENT
Start: 2023-03-13

## 2023-03-13 NOTE — PROGRESS NOTES
[facility]  Weight Management Program      Malgorzata Jimenez is a 47 y.o. female referred to the [facility] by Radha Nielsen for clinical pharmacy and specialty pharmacy management of GLP1 for weight management.  Malgorzata Jimenez has previously tried keto diet, weight watchers, Adipex and lifestyle changes for weight loss.  Current weight loss efforts include lifestyle changes watching what she eats and Wegovy 1.7 mg daily. The patient denies a personal history or family history of thyroid cancer and denies a personal history of pancreatitis. Patient denies lumps/hoarseness/swelling of throat or severe abdominal pain.     She denies any side effects to the medication except for mild nausea and mild constipation which she says is a chronic issue for her. She says she uses magnesium citrate and a stool softener as needed for constipation and thinks she has gained weight since last visit due to not having a bowel movement in last couple days. She denies missing any doses and reports that she is no longer having any itching after injections.    She reports stopping soda on the 1st of January.    Relevant Past Medical History and Co-morbidities  Past Medical History:   Diagnosis Date   • Allergic rhinitis due to pollen    • Deviated septum      Social History     Socioeconomic History   • Marital status:    Tobacco Use   • Smoking status: Never   • Smokeless tobacco: Never   Substance and Sexual Activity   • Alcohol use: No   • Drug use: No   • Sexual activity: Defer       Allergies  Penicillins    Current Medication List    Current Outpatient Medications:   •  butalbital-acetaminophen-caffeine (FIORICET, ESGIC) -40 MG per tablet, Take 1 tablet by mouth every 6-8 hours for headaches, Disp: 30 tablet, Rfl: 0  •  docusate calcium (Surfak) 240 MG capsule, Take 1 capsule by mouth daily for constipation., Disp: 30 capsule, Rfl: 2  •  EPINEPHrine (EPIPEN) 0.3 MG/0.3ML  solution auto-injector injection, Inject 1 pen injector intramuscularly as directed to be used for anaphylaxis only, Disp: 2 each, Rfl: 2  •  escitalopram (Lexapro) 20 MG tablet, Take 1 tablet by mouth Daily., Disp: 30 tablet, Rfl: 2  •  fluticasone (FLONASE) 50 MCG/ACT nasal spray, Use 2 sprays in each nostril once daily, Disp: 16 g, Rfl: 5  •  ibuprofen (ADVIL,MOTRIN) 800 MG tablet, Take 1 tablet by mouth 2 to 3 Times a Day. (Patient taking differently: Take 800 mg by mouth 3 (Three) Times a Day. Patient takes PRN), Disp: 90 tablet, Rfl: 1  •  Insulin Pen Needle (Pen Needles) 32G X 4 MM misc, Use to inject Saxenda once daily, Disp: 100 each, Rfl: 0  •  ipratropium (ATROVENT) 0.06 % nasal spray, Use 2 sprays in each nostril 3 times daily, Disp: 15 mL, Rfl: 5  •  levothyroxine (Synthroid) 25 MCG tablet, Take 1 tablet by mouth daily for thyroid., Disp: 30 tablet, Rfl: 2  •  metFORMIN ER (GLUCOPHAGE-XR) 500 MG 24 hr tablet, Take 2 tablets by mouth nightly, Disp: 60 tablet, Rfl: 2  •  Progesterone (Prometrium) 200 MG capsule, Take 1 capsule by mouth 2 times weekly, Disp: 9 capsule, Rfl: 2  •  Semaglutide-Weight Management (Wegovy) 1.7 MG/0.75ML solution auto-injector, Inject 0.75 mL under the skin into the appropriate area as directed 1 (One) Time Per Week., Disp: 3 mL, Rfl: 0  •  vitamin D (ERGOCALCIFEROL) 1.25 MG (85286 UT) capsule capsule, Take 1 capsule by mouth weekly; take with meals, Disp: 4 capsule, Rfl: 2    Drug Interactions  Saxenda+ Lexapro: May increase hypoglycemic effects  Saxenda + Prometrium: May decrease effects of Saxenda     Relevant Laboratory Values  Lab Results   Component Value Date    CHOL 159 12/22/2022    TRIG 191 (H) 12/22/2022    HDL 39 (L) 12/22/2022    LDL 87 12/22/2022     There is no height or weight on file to calculate BMI.    Vaccinations:   Patient recommended to keep up with routine vaccinations.       Goals of Therapy  • Clinical Goals or Therapeutic Targets: 10% weight loss  "goal      Date 9/8/22 10/6/22 11/11/22 12/12/22 1/9/23 2/13/23 3/13/23   Weight (lb) 235.4 lbs 224.6 lbs 222 lbs 217.6 lb 218lb 214.4 215.6 lb   BMI kg/ 43.05 41.07 40.6 39.8 39.8 39.21 39.43   Waist Circumference (in) 47\" 46.75'' 43.5\" 48 in.  44.5 in 44.5\" 44\"       Medication Assessment & Plan    Patient's current BMI is 39.21, which is considered Class II Obesity. She has lost 21 lbs since starting the Saxenda and Wegovy, congratulated her on her success.    Will order Wegovy 2.4 mg SC weekly.    Instructed patient to finish remaining doses of 1.7 mg before starting the 2.4 mg and to call clinic if constipation worsens and she has concerns.    Discussed lifestyle modifications, including diet and exercise.  Patient education provided.     Worked with patient to create SMART Goal(s):   1. Increase water intake 80 oz per day.   2. Continue to limit soda to no more than 0/day.      Will follow-up in 1 month.     Demario Sandoval RPH  3/13/2023  09:35 EDT  "

## 2023-03-20 ENCOUNTER — TRANSCRIBE ORDERS (OUTPATIENT)
Dept: ADMINISTRATIVE | Facility: HOSPITAL | Age: 48
End: 2023-03-20
Payer: COMMERCIAL

## 2023-03-20 ENCOUNTER — LAB (OUTPATIENT)
Dept: LAB | Facility: HOSPITAL | Age: 48
End: 2023-03-20
Payer: COMMERCIAL

## 2023-03-20 DIAGNOSIS — Z00.01 ENCOUNTER FOR GENERAL ADULT MEDICAL EXAMINATION WITH ABNORMAL FINDINGS: ICD-10-CM

## 2023-03-20 DIAGNOSIS — E03.9 HYPOTHYROIDISM, ADULT: ICD-10-CM

## 2023-03-20 DIAGNOSIS — E03.9 HYPOTHYROIDISM, UNSPECIFIED TYPE: ICD-10-CM

## 2023-03-20 DIAGNOSIS — I10 ESSENTIAL HYPERTENSION, MALIGNANT: ICD-10-CM

## 2023-03-20 DIAGNOSIS — Z13.220 SCREENING FOR LIPOID DISORDERS: ICD-10-CM

## 2023-03-20 DIAGNOSIS — I10 ESSENTIAL (PRIMARY) HYPERTENSION: ICD-10-CM

## 2023-03-20 DIAGNOSIS — K21.00 GASTROESOPHAGEAL REFLUX DISEASE WITH ESOPHAGITIS, UNSPECIFIED WHETHER HEMORRHAGE: ICD-10-CM

## 2023-03-20 DIAGNOSIS — R68.82 DECREASED LIBIDO: ICD-10-CM

## 2023-03-20 DIAGNOSIS — M25.531 RIGHT WRIST PAIN: ICD-10-CM

## 2023-03-20 DIAGNOSIS — E55.9 VITAMIN D DEFICIENCY, UNSPECIFIED: ICD-10-CM

## 2023-03-20 DIAGNOSIS — D51.9 ANEMIA DUE TO VITAMIN B12 DEFICIENCY, UNSPECIFIED B12 DEFICIENCY TYPE: ICD-10-CM

## 2023-03-20 DIAGNOSIS — Z13.1 SCREENING FOR DIABETES MELLITUS (DM): ICD-10-CM

## 2023-03-20 DIAGNOSIS — Z13.1 SCREENING FOR DIABETES MELLITUS: ICD-10-CM

## 2023-03-20 LAB
25(OH)D3 SERPL-MCNC: 43.6 NG/ML (ref 30–100)
ALBUMIN SERPL-MCNC: 3.9 G/DL (ref 3.5–5.2)
ALBUMIN/GLOB SERPL: 1.5 G/DL
ALP SERPL-CCNC: 81 U/L (ref 39–117)
ALT SERPL W P-5'-P-CCNC: 28 U/L (ref 1–33)
ANION GAP SERPL CALCULATED.3IONS-SCNC: 10 MMOL/L (ref 5–15)
AST SERPL-CCNC: 23 U/L (ref 1–32)
BACTERIA UR QL AUTO: ABNORMAL /HPF
BASOPHILS # BLD AUTO: 0.03 10*3/MM3 (ref 0–0.2)
BASOPHILS NFR BLD AUTO: 0.4 % (ref 0–1.5)
BILIRUB SERPL-MCNC: 0.2 MG/DL (ref 0–1.2)
BILIRUB UR QL STRIP: NEGATIVE
BUN SERPL-MCNC: 10 MG/DL (ref 6–20)
BUN/CREAT SERPL: 12.3 (ref 7–25)
CALCIUM SPEC-SCNC: 8.7 MG/DL (ref 8.6–10.5)
CHLORIDE SERPL-SCNC: 104 MMOL/L (ref 98–107)
CHOLEST SERPL-MCNC: 177 MG/DL (ref 0–200)
CLARITY UR: ABNORMAL
CO2 SERPL-SCNC: 24 MMOL/L (ref 22–29)
COLOR UR: ABNORMAL
CREAT SERPL-MCNC: 0.81 MG/DL (ref 0.57–1)
DEPRECATED RDW RBC AUTO: 39.1 FL (ref 37–54)
EGFRCR SERPLBLD CKD-EPI 2021: 89.1 ML/MIN/1.73
EOSINOPHIL # BLD AUTO: 0.25 10*3/MM3 (ref 0–0.4)
EOSINOPHIL NFR BLD AUTO: 3.4 % (ref 0.3–6.2)
ERYTHROCYTE [DISTWIDTH] IN BLOOD BY AUTOMATED COUNT: 12.2 % (ref 12.3–15.4)
ESTRADIOL SERPL HS-MCNC: 31.8 PG/ML
FOLATE SERPL-MCNC: >20 NG/ML (ref 4.78–24.2)
FSH SERPL-ACNC: 27.4 MIU/ML
GLOBULIN UR ELPH-MCNC: 2.6 GM/DL
GLUCOSE SERPL-MCNC: 86 MG/DL (ref 65–99)
GLUCOSE UR STRIP-MCNC: NEGATIVE MG/DL
HBA1C MFR BLD: 5.4 % (ref 4.8–5.6)
HCT VFR BLD AUTO: 42.7 % (ref 34–46.6)
HDLC SERPL-MCNC: 41 MG/DL (ref 40–60)
HGB BLD-MCNC: 14.5 G/DL (ref 12–15.9)
HGB UR QL STRIP.AUTO: ABNORMAL
HYALINE CASTS UR QL AUTO: ABNORMAL /LPF
IMM GRANULOCYTES # BLD AUTO: 0.02 10*3/MM3 (ref 0–0.05)
IMM GRANULOCYTES NFR BLD AUTO: 0.3 % (ref 0–0.5)
KETONES UR QL STRIP: NEGATIVE
LDLC SERPL CALC-MCNC: 112 MG/DL (ref 0–100)
LDLC/HDLC SERPL: 2.67 {RATIO}
LEUKOCYTE ESTERASE UR QL STRIP.AUTO: ABNORMAL
LH SERPL-ACNC: 12.8 MIU/ML
LYMPHOCYTES # BLD AUTO: 2.83 10*3/MM3 (ref 0.7–3.1)
LYMPHOCYTES NFR BLD AUTO: 38.7 % (ref 19.6–45.3)
MCH RBC QN AUTO: 29.9 PG (ref 26.6–33)
MCHC RBC AUTO-ENTMCNC: 34 G/DL (ref 31.5–35.7)
MCV RBC AUTO: 88 FL (ref 79–97)
MONOCYTES # BLD AUTO: 0.33 10*3/MM3 (ref 0.1–0.9)
MONOCYTES NFR BLD AUTO: 4.5 % (ref 5–12)
NEUTROPHILS NFR BLD AUTO: 3.86 10*3/MM3 (ref 1.7–7)
NEUTROPHILS NFR BLD AUTO: 52.7 % (ref 42.7–76)
NITRITE UR QL STRIP: NEGATIVE
NRBC BLD AUTO-RTO: 0 /100 WBC (ref 0–0.2)
PH UR STRIP.AUTO: 7.5 [PH] (ref 5–8)
PLATELET # BLD AUTO: 263 10*3/MM3 (ref 140–450)
PMV BLD AUTO: 11.2 FL (ref 6–12)
POTASSIUM SERPL-SCNC: 4.3 MMOL/L (ref 3.5–5.2)
PROGEST SERPL-MCNC: 2.66 NG/ML
PROT SERPL-MCNC: 6.5 G/DL (ref 6–8.5)
PROT UR QL STRIP: ABNORMAL
RBC # BLD AUTO: 4.85 10*6/MM3 (ref 3.77–5.28)
RBC # UR STRIP: ABNORMAL /HPF
REF LAB TEST METHOD: ABNORMAL
SODIUM SERPL-SCNC: 138 MMOL/L (ref 136–145)
SP GR UR STRIP: 1.02 (ref 1–1.03)
SQUAMOUS #/AREA URNS HPF: ABNORMAL /HPF
T-UPTAKE NFR SERPL: 1.15 TBI (ref 0.8–1.3)
T4 SERPL-MCNC: 7.95 MCG/DL (ref 4.5–11.7)
TRIGL SERPL-MCNC: 132 MG/DL (ref 0–150)
TSH SERPL DL<=0.05 MIU/L-ACNC: 1.6 UIU/ML (ref 0.27–4.2)
UROBILINOGEN UR QL STRIP: ABNORMAL
VIT B12 BLD-MCNC: 676 PG/ML (ref 211–946)
VLDLC SERPL-MCNC: 24 MG/DL (ref 5–40)
WBC # UR STRIP: ABNORMAL /HPF
WBC NRBC COR # BLD: 7.32 10*3/MM3 (ref 3.4–10.8)

## 2023-03-20 PROCEDURE — 84144 ASSAY OF PROGESTERONE: CPT

## 2023-03-20 PROCEDURE — 80050 GENERAL HEALTH PANEL: CPT

## 2023-03-20 PROCEDURE — 82306 VITAMIN D 25 HYDROXY: CPT

## 2023-03-20 PROCEDURE — 83036 HEMOGLOBIN GLYCOSYLATED A1C: CPT

## 2023-03-20 PROCEDURE — 83002 ASSAY OF GONADOTROPIN (LH): CPT

## 2023-03-20 PROCEDURE — 84403 ASSAY OF TOTAL TESTOSTERONE: CPT

## 2023-03-20 PROCEDURE — 82607 VITAMIN B-12: CPT

## 2023-03-20 PROCEDURE — 82670 ASSAY OF TOTAL ESTRADIOL: CPT

## 2023-03-20 PROCEDURE — 84479 ASSAY OF THYROID (T3 OR T4): CPT

## 2023-03-20 PROCEDURE — 80061 LIPID PANEL: CPT

## 2023-03-20 PROCEDURE — 36415 COLL VENOUS BLD VENIPUNCTURE: CPT

## 2023-03-20 PROCEDURE — 84402 ASSAY OF FREE TESTOSTERONE: CPT

## 2023-03-20 PROCEDURE — 84436 ASSAY OF TOTAL THYROXINE: CPT

## 2023-03-20 PROCEDURE — 83001 ASSAY OF GONADOTROPIN (FSH): CPT

## 2023-03-20 PROCEDURE — 81001 URINALYSIS AUTO W/SCOPE: CPT

## 2023-03-20 PROCEDURE — 82746 ASSAY OF FOLIC ACID SERUM: CPT

## 2023-03-20 PROCEDURE — 83525 ASSAY OF INSULIN: CPT

## 2023-03-21 LAB — INSULIN SERPL-ACNC: 24.5 UIU/ML (ref 2.6–24.9)

## 2023-03-25 LAB
TESTOST FREE SERPL-MCNC: 0.3 PG/ML (ref 0–4.2)
TESTOST SERPL-MCNC: 15.6 NG/DL

## 2023-04-10 ENCOUNTER — DISEASE STATE MANAGEMENT VISIT (OUTPATIENT)
Dept: PHARMACY | Facility: HOSPITAL | Age: 48
End: 2023-04-10
Payer: COMMERCIAL

## 2023-04-10 VITALS
WEIGHT: 216.6 LBS | SYSTOLIC BLOOD PRESSURE: 128 MMHG | HEIGHT: 62 IN | HEART RATE: 70 BPM | BODY MASS INDEX: 39.86 KG/M2 | DIASTOLIC BLOOD PRESSURE: 93 MMHG

## 2023-04-10 RX ORDER — SEMAGLUTIDE 2.4 MG/.75ML
2.4 INJECTION, SOLUTION SUBCUTANEOUS WEEKLY
Qty: 3 ML | Refills: 0 | Status: SHIPPED | OUTPATIENT
Start: 2023-04-10

## 2023-04-10 NOTE — PROGRESS NOTES
[facility]  Weight Management Program      Malgorzata Jimenez is a 47 y.o. female referred to the [facility] by Radha Nielsen for clinical pharmacy and specialty pharmacy management of GLP1 for weight management.  Malgorzata Jimenez has previously tried keto diet, weight watchers, Adipex and lifestyle changes for weight loss.  Current weight loss efforts include lifestyle changes watching what she eats and Wegovy 1.7 mg daily. The patient denies a personal history or family history of thyroid cancer and denies a personal history of pancreatitis. Patient denies lumps/hoarseness/swelling of throat or severe abdominal pain.     She denies any side effects to the medication except for mild nausea and mild constipation which she says is a chronic issue for her. She says she uses magnesium citrate and a stool softener as needed for constipation. She denies this decreasing quality of life.   She denies missing any doses and reports that she is no longer having any itching after injections. Patient reported that she has mostly eliminated dairy to help with constipation issues.  Patient reports that she has used 2 doses of Wegovy 2.4 mg and that she does not feel a lot of appetite suppression thus far. Patient completed the 1.7 mg doses prior to starting the 2.4 mg on last visit.       Relevant Past Medical History and Co-morbidities  Past Medical History:   Diagnosis Date   • Allergic rhinitis due to pollen    • Deviated septum      Social History     Socioeconomic History   • Marital status:    Tobacco Use   • Smoking status: Never   • Smokeless tobacco: Never   Substance and Sexual Activity   • Alcohol use: No   • Drug use: No   • Sexual activity: Defer       Allergies  Penicillins    Current Medication List    Current Outpatient Medications:   •  butalbital-acetaminophen-caffeine (FIORICET, ESGIC) -40 MG per tablet, Take 1 tablet by mouth every 6-8 hours for headaches,  Disp: 30 tablet, Rfl: 0  •  docusate calcium (Surfak) 240 MG capsule, Take 1 capsule by mouth daily for constipation., Disp: 30 capsule, Rfl: 2  •  EPINEPHrine (EPIPEN) 0.3 MG/0.3ML solution auto-injector injection, Inject 1 pen injector intramuscularly as directed to be used for anaphylaxis only, Disp: 2 each, Rfl: 2  •  escitalopram (Lexapro) 20 MG tablet, Take 1 tablet by mouth Daily., Disp: 30 tablet, Rfl: 2  •  fluticasone (FLONASE) 50 MCG/ACT nasal spray, Use 2 sprays in each nostril once daily, Disp: 16 g, Rfl: 5  •  ibuprofen (ADVIL,MOTRIN) 800 MG tablet, Take 1 tablet by mouth 2 to 3 Times a Day. (Patient taking differently: Take 800 mg by mouth 3 (Three) Times a Day. Patient takes PRN), Disp: 90 tablet, Rfl: 1  •  Insulin Pen Needle (Pen Needles) 32G X 4 MM misc, Use to inject Saxenda once daily, Disp: 100 each, Rfl: 0  •  ipratropium (ATROVENT) 0.06 % nasal spray, Use 2 sprays in each nostril 3 times daily, Disp: 15 mL, Rfl: 5  •  levothyroxine (Synthroid) 25 MCG tablet, Take 1 tablet by mouth daily for thyroid., Disp: 30 tablet, Rfl: 2  •  levothyroxine (Synthroid) 25 MCG tablet, Take 1 tablet by mouth Daily., Disp: 30 tablet, Rfl: 2  •  metFORMIN ER (GLUCOPHAGE-XR) 500 MG 24 hr tablet, Take 2 tablets by mouth nightly, Disp: 60 tablet, Rfl: 2  •  Progesterone (Prometrium) 200 MG capsule, Take 1 capsule by mouth 2 times weekly, Disp: 9 capsule, Rfl: 2  •  Semaglutide-Weight Management (Wegovy) 2.4 MG/0.75ML solution auto-injector, Inject 2.4 mg under the skin into the appropriate area as directed 1 (One) Time Per Week., Disp: 3 mL, Rfl: 0  •  vitamin D (ERGOCALCIFEROL) 1.25 MG (81081 UT) capsule capsule, Take 1 capsule by mouth weekly; take with meals, Disp: 4 capsule, Rfl: 2    Drug Interactions  Saxenda+ Lexapro: May increase hypoglycemic effects  Saxenda + Prometrium: May decrease effects of Saxenda     Relevant Laboratory Values  Lab Results   Component Value Date    CHOL 177 03/20/2023    TRIG 132  "03/20/2023    HDL 41 03/20/2023     (H) 03/20/2023     There is no height or weight on file to calculate BMI.    Vaccinations:   Patient recommended to keep up with routine vaccinations.       Goals of Therapy  • Clinical Goals or Therapeutic Targets: 10% weight loss goal      Date 9/8/22 10/6/22 11/11/22 12/12/22 1/9/23 2/13/23 3/13/23 4/10/23   Weight (lb) 235.4 lbs 224.6 lbs 222 lbs 217.6 lb 218lb 214.4 215.6 lb 216.6 lbs   BMI kg/ 43.05 41.07 40.6 39.8 39.8 39.21 39.43 39.62   Waist Circumference (in) 47\" 46.75'' 43.5\" 48 in.  44.5 in 44.5\" 44\" 45\"       Medication Assessment & Plan    Patient's current BMI is 39.21, which is considered Class II Obesity. She has lost 21 lbs since starting the Saxenda and Wegovy, congratulated her on her success. Patient has not had weight loss the last 2 visits but reported she hasn't felt a lot of appetite suppression. Patient has only had 2 doses of Wegovy 2.4 mg thus far.     Will order Wegovy 2.4 mg SC weekly.       Patient had lab work completed on 3/20/23. Patient reported that Radha Nielsen, PCP reviewed these with her on 3/27/23. Patient reported she did discuss her Lipid Panel with her and elevations and to make dietary adjustments.     Discussed lifestyle modifications, including diet and exercise.  Patient education provided.     Worked with patient to create SMART Goal(s): continue to target these goals   1. Increase water intake 80 oz per day.   2. Continue to limit soda to no more than 0/day.      Will follow-up in 1 month.     Crystal Ball. Best, PharmD  4/10/2023  09:38 EDT  "

## 2023-05-11 ENCOUNTER — DISEASE STATE MANAGEMENT VISIT (OUTPATIENT)
Dept: PHARMACY | Facility: HOSPITAL | Age: 48
End: 2023-05-11
Payer: COMMERCIAL

## 2023-05-11 VITALS
DIASTOLIC BLOOD PRESSURE: 84 MMHG | SYSTOLIC BLOOD PRESSURE: 127 MMHG | WEIGHT: 208 LBS | HEART RATE: 69 BPM | BODY MASS INDEX: 38.28 KG/M2 | HEIGHT: 62 IN

## 2023-05-11 RX ORDER — SEMAGLUTIDE 2.4 MG/.75ML
2.4 INJECTION, SOLUTION SUBCUTANEOUS WEEKLY
Qty: 3 ML | Refills: 0 | Status: SHIPPED | OUTPATIENT
Start: 2023-05-11

## 2023-05-11 NOTE — PROGRESS NOTES
[facility]  Weight Management Program      Malgorzata Jimenez is a 47 y.o. female referred to the [facility] by Radha Nielsen for clinical pharmacy and specialty pharmacy management of GLP1 for weight management.  Malgorzata Jimenez has previously tried keto diet, weight watchers, Adipex and lifestyle changes for weight loss.  Current weight loss efforts include lifestyle changes watching what she eats and Wegovy 1.7 mg daily. The patient denies a personal history or family history of thyroid cancer and denies a personal history of pancreatitis.     Patient is currently on Wegovy 2.4 mg weekly. Patient denies any lumps/swelling/hoarseness in neck/throat or abdominal pain. Patient reports that she has some mild nausea but that it is not unbearable and is usually only the day after her injection. She also reports some constipation but that magnesium citrate or a stool softener as needed helps. Patient denies any trouble giving injection or any missed doses. Patient wants to continue working toward her water goal but has cut down soda to one can every couple of weeks. She would like to change this goal and start walking 3 times a week.      Relevant Past Medical History and Co-morbidities  Past Medical History:   Diagnosis Date   • Allergic rhinitis due to pollen    • Deviated septum      Social History     Socioeconomic History   • Marital status:    Tobacco Use   • Smoking status: Never   • Smokeless tobacco: Never   Substance and Sexual Activity   • Alcohol use: No   • Drug use: No   • Sexual activity: Defer       Allergies  Penicillins    Current Medication List    Current Outpatient Medications:   •  butalbital-acetaminophen-caffeine (FIORICET, ESGIC) -40 MG per tablet, Take 1 tablet by mouth every 6-8 hours for headaches, Disp: 30 tablet, Rfl: 0  •  butalbital-acetaminophen-caffeine (FIORICET, ESGIC) -40 MG per tablet, Take 1 tablet by mouth every 6-8  hours; headaches, Disp: 30 tablet, Rfl: 0  •  EPINEPHrine (EPIPEN) 0.3 MG/0.3ML solution auto-injector injection, Inject 1 pen injector intramuscularly as directed to be used for anaphylaxis only, Disp: 2 each, Rfl: 2  •  escitalopram (Lexapro) 20 MG tablet, Take 1 tablet by mouth daily, Disp: 30 tablet, Rfl: 2  •  fluticasone (FLONASE) 50 MCG/ACT nasal spray, Use 2 sprays in each nostril once daily, Disp: 16 g, Rfl: 5  •  ibuprofen (ADVIL,MOTRIN) 800 MG tablet, Take 1 tablet by mouth 2 to 3 Times a Day. (Patient taking differently: Take 800 mg by mouth 3 (Three) Times a Day. Patient takes PRN), Disp: 90 tablet, Rfl: 1  •  Insulin Pen Needle (Pen Needles) 32G X 4 MM misc, Use to inject Saxenda once daily, Disp: 100 each, Rfl: 0  •  ipratropium (ATROVENT) 0.06 % nasal spray, Use 2 sprays in each nostril 3 times daily, Disp: 15 mL, Rfl: 5  •  levothyroxine (Synthroid) 25 MCG tablet, Take 1 tablet by mouth daily for thyroid., Disp: 30 tablet, Rfl: 2  •  levothyroxine (Synthroid) 25 MCG tablet, Take 1 tablet by mouth daily; thyroid, Disp: 30 tablet, Rfl: 2  •  metFORMIN ER (GLUCOPHAGE-XR) 500 MG 24 hr tablet, Take 2 tablets by mouth nightly, Disp: 60 tablet, Rfl: 2  •  metFORMIN ER (GLUCOPHAGE-XR) 500 MG 24 hr tablet, Take 2 tablets by mouth nightly, Disp: 60 tablet, Rfl: 2  •  polyethylene glycol (MiraLax) 17 g packet, Mix 17 grams of powder in 4 to 8 ounce of liquid and take  by mouth 3 times weekly; constipation, Disp: 10 each, Rfl: 2  •  Progesterone (Prometrium) 200 MG capsule, Take 1 capsule by mouth 2 times weekly, Disp: 9 capsule, Rfl: 2  •  Semaglutide-Weight Management (Wegovy) 2.4 MG/0.75ML solution auto-injector, Inject 2.4 mg under the skin into the appropriate area as directed 1 (One) Time Per Week., Disp: 3 mL, Rfl: 0  •  vitamin D (ERGOCALCIFEROL) 1.25 MG (88593 UT) capsule capsule, Take 1 capsule by mouth weekly; take with meals, Disp: 4 capsule, Rfl: 2  •  vitamin D (ERGOCALCIFEROL) 1.25 MG (11270 UT)  "capsule capsule, Take 1 capsule by mouth weekly; take with meals, Disp: 4 capsule, Rfl: 2    Drug Interactions  Lexapro: May increase hypoglycemic effects  Prometrium: May decrease effects of Wegovy    Relevant Laboratory Values  Lab Results   Component Value Date    CHOL 177 03/20/2023    TRIG 132 03/20/2023    HDL 41 03/20/2023     (H) 03/20/2023     There is no height or weight on file to calculate BMI.    Vaccinations:   Patient recommended to keep up with routine vaccinations.       Goals of Therapy  • Clinical Goals or Therapeutic Targets: 10% weight loss goal      Date 9/8/22 10/6/22 11/11/22 12/12/22 1/9/23 2/13/23 3/13/23 4/10/23 5/11/23   Weight (lb) 235.4 lbs 224.6 lbs 222 lbs 217.6 lb 218lb 214.4 215.6 lb 216.6 lbs 208 lb   BMI kg/ 43.05 41.07 40.6 39.8 39.8 39.21 39.43 39.62 38.04   Waist Circumference (in) 47\" 46.75'' 43.5\" 48 in.  44.5 in 44.5\" 44\" 45\" 43.5\"       Medication Assessment & Plan    Patient's current BMI is 38.04, which is considered Class II Obesity. She has lost 27.4 lbs overall, congratulated her on her success.    Will order Wegovy 2.4 mg SC weekly.       Discussed lifestyle modifications, including diet and exercise.  Patient education provided.     Worked with patient to create SMART Goal(s):   1. Increase water intake 80 oz per day.   2. Walk for 30 minutes 3 day a week      Will follow-up in 1 month.     Madeline Blake, PharmD  5/11/2023  10:27 EDT  "

## 2023-06-09 ENCOUNTER — DISEASE STATE MANAGEMENT VISIT (OUTPATIENT)
Dept: PHARMACY | Facility: HOSPITAL | Age: 48
End: 2023-06-09
Payer: COMMERCIAL

## 2023-06-09 VITALS
BODY MASS INDEX: 38.46 KG/M2 | DIASTOLIC BLOOD PRESSURE: 86 MMHG | HEIGHT: 62 IN | SYSTOLIC BLOOD PRESSURE: 128 MMHG | HEART RATE: 70 BPM | WEIGHT: 209 LBS

## 2023-06-09 RX ORDER — SEMAGLUTIDE 2.4 MG/.75ML
2.4 INJECTION, SOLUTION SUBCUTANEOUS WEEKLY
Qty: 3 ML | Refills: 0 | Status: SHIPPED | OUTPATIENT
Start: 2023-06-09

## 2023-06-09 NOTE — PROGRESS NOTES
[facility]  Weight Management Program      Malgorzata Jimenez is a 47 y.o. female referred to the [facility] by Radha Nielsen for clinical pharmacy and specialty pharmacy management of GLP1 for weight management.  Malgorzata Jimenez has previously tried keto diet, weight watchers, Adipex and lifestyle changes for weight loss.  Current weight loss efforts include lifestyle changes watching what she eats and Wegovy 1.7 mg daily. The patient denies a personal history or family history of thyroid cancer and denies a personal history of pancreatitis    Patient is currently on Wegovy 2.4 mg weekly. Patient denies any lumps/swelling/hoarseness in neck/throat or abdominal pain. Patient reports some constipation but that it has improved. She currently takes an OTC stool softener. She has not had to use magnesium citrate this month. Patient denies any trouble giving injection or any missed doses. Patient is doing well with her SMART goals most days and would like to continue targeting these for now.    Relevant Past Medical History and Co-morbidities  Past Medical History:   Diagnosis Date    Allergic rhinitis due to pollen     Deviated septum      Social History     Socioeconomic History    Marital status:    Tobacco Use    Smoking status: Never    Smokeless tobacco: Never   Substance and Sexual Activity    Alcohol use: No    Drug use: No    Sexual activity: Defer       Allergies  Penicillins    Current Medication List    Current Outpatient Medications:     butalbital-acetaminophen-caffeine (FIORICET, ESGIC) -40 MG per tablet, Take 1 tablet by mouth every 6-8 hours for headaches, Disp: 30 tablet, Rfl: 0    butalbital-acetaminophen-caffeine (FIORICET, ESGIC) -40 MG per tablet, Take 1 tablet by mouth every 6-8 hours; headaches, Disp: 30 tablet, Rfl: 0    EPINEPHrine (EPIPEN) 0.3 MG/0.3ML solution auto-injector injection, Inject 1 pen injector intramuscularly as directed  to be used for anaphylaxis only, Disp: 2 each, Rfl: 2    escitalopram (Lexapro) 20 MG tablet, Take 1 tablet by mouth daily, Disp: 30 tablet, Rfl: 2    fluticasone (FLONASE) 50 MCG/ACT nasal spray, Use 2 sprays in each nostril once daily, Disp: 16 g, Rfl: 5    ibuprofen (ADVIL,MOTRIN) 800 MG tablet, Take 1 tablet by mouth 2 to 3 Times a Day. (Patient taking differently: Take 800 mg by mouth 3 (Three) Times a Day. Patient takes PRN), Disp: 90 tablet, Rfl: 1    Insulin Pen Needle (Pen Needles) 32G X 4 MM misc, Use to inject Saxenda once daily, Disp: 100 each, Rfl: 0    ipratropium (ATROVENT) 0.06 % nasal spray, Use 2 sprays in each nostril 3 times daily, Disp: 15 mL, Rfl: 5    levothyroxine (Synthroid) 25 MCG tablet, Take 1 tablet by mouth daily for thyroid., Disp: 30 tablet, Rfl: 2    levothyroxine (Synthroid) 25 MCG tablet, Take 1 tablet by mouth daily; thyroid, Disp: 30 tablet, Rfl: 2    metFORMIN ER (GLUCOPHAGE-XR) 500 MG 24 hr tablet, Take 2 tablets by mouth nightly, Disp: 60 tablet, Rfl: 2    metFORMIN ER (GLUCOPHAGE-XR) 500 MG 24 hr tablet, Take 2 tablets by mouth nightly, Disp: 60 tablet, Rfl: 2    polyethylene glycol (MiraLax) 17 g packet, Mix 17 grams of powder in 4 to 8 ounce of liquid and take  by mouth 3 times weekly; constipation, Disp: 10 each, Rfl: 2    Progesterone (Prometrium) 200 MG capsule, Take 1 capsule by mouth 2 times weekly, Disp: 9 capsule, Rfl: 2    Semaglutide-Weight Management (Wegovy) 2.4 MG/0.75ML solution auto-injector, Inject 2.4 mg under the skin into the appropriate area as directed 1 (One) Time Per Week., Disp: 3 mL, Rfl: 0    vitamin D (ERGOCALCIFEROL) 1.25 MG (91074 UT) capsule capsule, Take 1 capsule by mouth weekly; take with meals, Disp: 4 capsule, Rfl: 2    vitamin D (ERGOCALCIFEROL) 1.25 MG (96853 UT) capsule capsule, Take 1 capsule by mouth weekly; take with meals, Disp: 4 capsule, Rfl: 2    Drug Interactions  Lexapro: May increase hypoglycemic effects  Prometrium: May  "decrease effects of Wegovy    Relevant Laboratory Values  Lab Results   Component Value Date    CHOL 177 03/20/2023    TRIG 132 03/20/2023    HDL 41 03/20/2023     (H) 03/20/2023     There is no height or weight on file to calculate BMI.    Vaccinations:   Patient recommended to keep up with routine vaccinations.       Goals of Therapy  Clinical Goals or Therapeutic Targets: 10% weight loss goal      Date 9/8/22 10/6/22 11/11/22 12/12/22 1/9/23 2/13/23 3/13/23 4/10/23 5/11/23 6/9/23   Weight (lb) 235.4 lbs 224.6 lbs 222 lbs 217.6 lb 218lb 214.4 215.6 lb 216.6 lbs 208 lb 209 lb   BMI kg/ 43.05 41.07 40.6 39.8 39.8 39.21 39.43 39.62 38.04 38.23   Waist Circumference (in) 47\" 46.75'' 43.5\" 48 in.  44.5 in 44.5\" 44\" 45\" 43.5\" 41.5\"       Medication Assessment & Plan    Patient's current BMI is 38.23, which is considered Class II Obesity. She has lost 26.4 lbs overall, congratulated her on her success.    Will order Wegovy 2.4 mg SC weekly.       Discussed lifestyle modifications, including diet and exercise.  Patient education provided.     Worked with patient to create SMART Goal(s):   1. Increase water intake 80 oz per day.   2. Walk for 30 minutes 3 day a week    Will follow-up in 1 month.     Madeline Blake, PharmD  6/9/2023  09:16 EDT  "

## 2023-10-10 ENCOUNTER — TRANSCRIBE ORDERS (OUTPATIENT)
Dept: ADMINISTRATIVE | Facility: HOSPITAL | Age: 48
End: 2023-10-10
Payer: COMMERCIAL

## 2023-10-10 DIAGNOSIS — Z12.31 VISIT FOR SCREENING MAMMOGRAM: Primary | ICD-10-CM

## 2023-10-25 ENCOUNTER — LAB (OUTPATIENT)
Dept: LAB | Facility: HOSPITAL | Age: 48
End: 2023-10-25
Payer: COMMERCIAL

## 2023-10-25 ENCOUNTER — TRANSCRIBE ORDERS (OUTPATIENT)
Dept: ADMINISTRATIVE | Facility: HOSPITAL | Age: 48
End: 2023-10-25
Payer: COMMERCIAL

## 2023-10-25 DIAGNOSIS — D50.9 IRON DEFICIENCY ANEMIA, UNSPECIFIED IRON DEFICIENCY ANEMIA TYPE: ICD-10-CM

## 2023-10-25 DIAGNOSIS — E55.9 VITAMIN D DEFICIENCY: ICD-10-CM

## 2023-10-25 DIAGNOSIS — I10 ESSENTIAL (PRIMARY) HYPERTENSION: Primary | ICD-10-CM

## 2023-10-25 DIAGNOSIS — E03.9 HYPOTHYROIDISM, UNSPECIFIED TYPE: ICD-10-CM

## 2023-10-25 DIAGNOSIS — E88.810 METABOLIC SYNDROME: ICD-10-CM

## 2023-10-25 DIAGNOSIS — D51.9 ANEMIA DUE TO VITAMIN B12 DEFICIENCY, UNSPECIFIED B12 DEFICIENCY TYPE: ICD-10-CM

## 2023-10-25 DIAGNOSIS — I10 ESSENTIAL (PRIMARY) HYPERTENSION: ICD-10-CM

## 2023-10-25 LAB
25(OH)D3 SERPL-MCNC: 40.6 NG/ML (ref 30–100)
ALBUMIN SERPL-MCNC: 3.9 G/DL (ref 3.5–5.2)
ALBUMIN/GLOB SERPL: 1.4 G/DL
ALP SERPL-CCNC: 72 U/L (ref 39–117)
ALT SERPL W P-5'-P-CCNC: 21 U/L (ref 1–33)
ANION GAP SERPL CALCULATED.3IONS-SCNC: 9.3 MMOL/L (ref 5–15)
AST SERPL-CCNC: 19 U/L (ref 1–32)
BASOPHILS # BLD AUTO: 0.04 10*3/MM3 (ref 0–0.2)
BASOPHILS NFR BLD AUTO: 0.6 % (ref 0–1.5)
BILIRUB SERPL-MCNC: 0.3 MG/DL (ref 0–1.2)
BILIRUB UR QL STRIP: NEGATIVE
BUN SERPL-MCNC: 11 MG/DL (ref 6–20)
BUN/CREAT SERPL: 11.1 (ref 7–25)
CALCIUM SPEC-SCNC: 9.1 MG/DL (ref 8.6–10.5)
CANCER AG125 SERPL QL: 9.2 U/ML (ref 0–38.1)
CHLORIDE SERPL-SCNC: 104 MMOL/L (ref 98–107)
CHOLEST SERPL-MCNC: 168 MG/DL (ref 0–200)
CLARITY UR: ABNORMAL
CO2 SERPL-SCNC: 25.7 MMOL/L (ref 22–29)
COLOR UR: ABNORMAL
CREAT SERPL-MCNC: 0.99 MG/DL (ref 0.57–1)
DEPRECATED RDW RBC AUTO: 40.8 FL (ref 37–54)
EGFRCR SERPLBLD CKD-EPI 2021: 69.6 ML/MIN/1.73
EOSINOPHIL # BLD AUTO: 0.3 10*3/MM3 (ref 0–0.4)
EOSINOPHIL NFR BLD AUTO: 4.2 % (ref 0.3–6.2)
ERYTHROCYTE [DISTWIDTH] IN BLOOD BY AUTOMATED COUNT: 12.4 % (ref 12.3–15.4)
ESTRADIOL SERPL HS-MCNC: 89.8 PG/ML
FOLATE SERPL-MCNC: >20 NG/ML (ref 4.78–24.2)
FSH SERPL-ACNC: 11.4 MIU/ML
GLOBULIN UR ELPH-MCNC: 2.8 GM/DL
GLUCOSE SERPL-MCNC: 79 MG/DL (ref 65–99)
GLUCOSE UR STRIP-MCNC: NEGATIVE MG/DL
HBA1C MFR BLD: 5.2 % (ref 4.8–5.6)
HCT VFR BLD AUTO: 43.7 % (ref 34–46.6)
HDLC SERPL-MCNC: 42 MG/DL (ref 40–60)
HGB BLD-MCNC: 14.3 G/DL (ref 12–15.9)
HGB UR QL STRIP.AUTO: ABNORMAL
IMM GRANULOCYTES # BLD AUTO: 0.01 10*3/MM3 (ref 0–0.05)
IMM GRANULOCYTES NFR BLD AUTO: 0.1 % (ref 0–0.5)
KETONES UR QL STRIP: NEGATIVE
LDLC SERPL CALC-MCNC: 113 MG/DL (ref 0–100)
LDLC/HDLC SERPL: 2.69 {RATIO}
LEUKOCYTE ESTERASE UR QL STRIP.AUTO: ABNORMAL
LH SERPL-ACNC: 4.18 MIU/ML
LYMPHOCYTES # BLD AUTO: 3.02 10*3/MM3 (ref 0.7–3.1)
LYMPHOCYTES NFR BLD AUTO: 42.2 % (ref 19.6–45.3)
MCH RBC QN AUTO: 29.4 PG (ref 26.6–33)
MCHC RBC AUTO-ENTMCNC: 32.7 G/DL (ref 31.5–35.7)
MCV RBC AUTO: 89.9 FL (ref 79–97)
MONOCYTES # BLD AUTO: 0.31 10*3/MM3 (ref 0.1–0.9)
MONOCYTES NFR BLD AUTO: 4.3 % (ref 5–12)
NEUTROPHILS NFR BLD AUTO: 3.47 10*3/MM3 (ref 1.7–7)
NEUTROPHILS NFR BLD AUTO: 48.6 % (ref 42.7–76)
NITRITE UR QL STRIP: NEGATIVE
NRBC BLD AUTO-RTO: 0 /100 WBC (ref 0–0.2)
PH UR STRIP.AUTO: 8.5 [PH] (ref 5–8)
PLATELET # BLD AUTO: 265 10*3/MM3 (ref 140–450)
PMV BLD AUTO: 10.7 FL (ref 6–12)
POTASSIUM SERPL-SCNC: 4.1 MMOL/L (ref 3.5–5.2)
PROGEST SERPL-MCNC: 0.16 NG/ML
PROT SERPL-MCNC: 6.7 G/DL (ref 6–8.5)
PROT UR QL STRIP: ABNORMAL
RBC # BLD AUTO: 4.86 10*6/MM3 (ref 3.77–5.28)
SODIUM SERPL-SCNC: 139 MMOL/L (ref 136–145)
SP GR UR STRIP: 1.01 (ref 1–1.03)
T-UPTAKE NFR SERPL: 1.09 TBI (ref 0.8–1.3)
T4 SERPL-MCNC: 7.47 MCG/DL (ref 4.5–11.7)
TRIGL SERPL-MCNC: 66 MG/DL (ref 0–150)
TSH SERPL DL<=0.05 MIU/L-ACNC: 1.3 UIU/ML (ref 0.27–4.2)
UROBILINOGEN UR QL STRIP: ABNORMAL
VIT B12 BLD-MCNC: 500 PG/ML (ref 211–946)
VLDLC SERPL-MCNC: 13 MG/DL (ref 5–40)
WBC NRBC COR # BLD: 7.15 10*3/MM3 (ref 3.4–10.8)

## 2023-10-25 PROCEDURE — 84402 ASSAY OF FREE TESTOSTERONE: CPT

## 2023-10-25 PROCEDURE — 82607 VITAMIN B-12: CPT

## 2023-10-25 PROCEDURE — 83001 ASSAY OF GONADOTROPIN (FSH): CPT

## 2023-10-25 PROCEDURE — 81003 URINALYSIS AUTO W/O SCOPE: CPT

## 2023-10-25 PROCEDURE — 80050 GENERAL HEALTH PANEL: CPT

## 2023-10-25 PROCEDURE — 86038 ANTINUCLEAR ANTIBODIES: CPT

## 2023-10-25 PROCEDURE — 82670 ASSAY OF TOTAL ESTRADIOL: CPT

## 2023-10-25 PROCEDURE — 86304 IMMUNOASSAY TUMOR CA 125: CPT

## 2023-10-25 PROCEDURE — 84144 ASSAY OF PROGESTERONE: CPT

## 2023-10-25 PROCEDURE — 84479 ASSAY OF THYROID (T3 OR T4): CPT

## 2023-10-25 PROCEDURE — 36415 COLL VENOUS BLD VENIPUNCTURE: CPT

## 2023-10-25 PROCEDURE — 83525 ASSAY OF INSULIN: CPT

## 2023-10-25 PROCEDURE — 80061 LIPID PANEL: CPT

## 2023-10-25 PROCEDURE — 82306 VITAMIN D 25 HYDROXY: CPT

## 2023-10-25 PROCEDURE — 84436 ASSAY OF TOTAL THYROXINE: CPT

## 2023-10-25 PROCEDURE — 83036 HEMOGLOBIN GLYCOSYLATED A1C: CPT

## 2023-10-25 PROCEDURE — 84403 ASSAY OF TOTAL TESTOSTERONE: CPT

## 2023-10-25 PROCEDURE — 83002 ASSAY OF GONADOTROPIN (LH): CPT

## 2023-10-25 PROCEDURE — 82746 ASSAY OF FOLIC ACID SERUM: CPT

## 2023-10-26 LAB
ANA SER QL: NEGATIVE
INSULIN SERPL-ACNC: 13.1 UIU/ML (ref 2.6–24.9)

## 2023-10-30 LAB
TESTOST FREE SERPL-MCNC: 0.2 PG/ML (ref 0–4.2)
TESTOST SERPL-MCNC: 4 NG/DL (ref 4–50)

## 2023-11-27 ENCOUNTER — HOSPITAL ENCOUNTER (OUTPATIENT)
Dept: MAMMOGRAPHY | Facility: HOSPITAL | Age: 48
Discharge: HOME | End: 2023-11-27
Admitting: NURSE PRACTITIONER
Payer: COMMERCIAL

## 2023-11-27 DIAGNOSIS — Z12.31 VISIT FOR SCREENING MAMMOGRAM: ICD-10-CM

## 2023-11-27 PROCEDURE — 77067 SCR MAMMO BI INCL CAD: CPT

## 2023-11-27 PROCEDURE — 77063 BREAST TOMOSYNTHESIS BI: CPT

## 2023-11-27 PROCEDURE — 77067 SCR MAMMO BI INCL CAD: CPT | Performed by: RADIOLOGY

## 2023-11-27 PROCEDURE — 77063 BREAST TOMOSYNTHESIS BI: CPT | Performed by: RADIOLOGY

## 2024-02-13 ENCOUNTER — OFFICE VISIT (OUTPATIENT)
Dept: FAMILY MEDICINE CLINIC | Facility: CLINIC | Age: 49
End: 2024-02-13
Payer: COMMERCIAL

## 2024-02-13 VITALS
OXYGEN SATURATION: 97 % | SYSTOLIC BLOOD PRESSURE: 122 MMHG | HEIGHT: 62 IN | WEIGHT: 207 LBS | DIASTOLIC BLOOD PRESSURE: 74 MMHG | HEART RATE: 75 BPM | TEMPERATURE: 97.3 F | BODY MASS INDEX: 38.09 KG/M2

## 2024-02-13 DIAGNOSIS — J30.89 SEASONAL ALLERGIC RHINITIS DUE TO OTHER ALLERGIC TRIGGER: ICD-10-CM

## 2024-02-13 DIAGNOSIS — Z12.11 ENCOUNTER FOR SCREENING COLONOSCOPY: ICD-10-CM

## 2024-02-13 DIAGNOSIS — K21.9 GASTROESOPHAGEAL REFLUX DISEASE WITHOUT ESOPHAGITIS: Primary | ICD-10-CM

## 2024-02-13 DIAGNOSIS — E03.9 HYPOTHYROIDISM, UNSPECIFIED TYPE: ICD-10-CM

## 2024-02-13 LAB
ALBUMIN SERPL-MCNC: 4.5 G/DL (ref 3.5–5.2)
ALBUMIN/GLOB SERPL: 2 G/DL
ALP SERPL-CCNC: 80 U/L (ref 39–117)
ALT SERPL W P-5'-P-CCNC: 17 U/L (ref 1–33)
ANION GAP SERPL CALCULATED.3IONS-SCNC: 10 MMOL/L (ref 5–15)
AST SERPL-CCNC: 15 U/L (ref 1–32)
BILIRUB SERPL-MCNC: 0.2 MG/DL (ref 0–1.2)
BUN SERPL-MCNC: 8 MG/DL (ref 6–20)
BUN/CREAT SERPL: 8.4 (ref 7–25)
CALCIUM SPEC-SCNC: 9.3 MG/DL (ref 8.6–10.5)
CHLORIDE SERPL-SCNC: 102 MMOL/L (ref 98–107)
CO2 SERPL-SCNC: 26 MMOL/L (ref 22–29)
CREAT SERPL-MCNC: 0.95 MG/DL (ref 0.57–1)
EGFRCR SERPLBLD CKD-EPI 2021: 73.1 ML/MIN/1.73
GLOBULIN UR ELPH-MCNC: 2.3 GM/DL
GLUCOSE SERPL-MCNC: 74 MG/DL (ref 65–99)
POTASSIUM SERPL-SCNC: 4.3 MMOL/L (ref 3.5–5.2)
PROT SERPL-MCNC: 6.8 G/DL (ref 6–8.5)
SODIUM SERPL-SCNC: 138 MMOL/L (ref 136–145)

## 2024-02-13 PROCEDURE — 80053 COMPREHEN METABOLIC PANEL: CPT | Performed by: FAMILY MEDICINE

## 2024-02-13 PROCEDURE — 36415 COLL VENOUS BLD VENIPUNCTURE: CPT | Performed by: FAMILY MEDICINE

## 2024-02-13 RX ORDER — PHENTERMINE HYDROCHLORIDE 15 MG/1
15 CAPSULE ORAL EVERY MORNING
COMMUNITY

## 2024-02-13 RX ORDER — LEVOTHYROXINE, LIOTHYRONINE 9.5; 2.25 UG/1; UG/1
1 TABLET ORAL DAILY
COMMUNITY
Start: 2024-01-07

## 2024-02-13 NOTE — PROGRESS NOTES
"Malgorzata Jimenez     VITALS: Blood pressure 122/74, pulse 75, temperature 97.3 °F (36.3 °C), temperature source Temporal, height 157.5 cm (62\"), weight 93.9 kg (207 lb), SpO2 97%.    Subjective  Chief Complaint  Hypothyroidism    Subjective          History of Present Illness:  Patient is a 48 y.o.  female with medical conditions significant for GERD and hypothyroidism who presents to clinic secondary to establishment of care.  No new or acute concerns today.    No complaints about any of the medications.    The following portions of the patient's history were reviewed and updated as appropriate: allergies, current medications, past family history, past medical history, past social history, past surgical history and problem list.    Past Medical History  Past Medical History:   Diagnosis Date    Allergic rhinitis due to pollen     Arthritis     Bilateral ovarian cysts     Depression     Deviated septum     GERD (gastroesophageal reflux disease)     Headache     Thyroid disease        Surgical History  Past Surgical History:   Procedure Laterality Date    GALLBLADDER SURGERY      SEPTOPLASTY, RESECTION INFERIOR TURBINATES Bilateral 3/11/2019    Procedure: SEPTOPLASTY;  Surgeon: Leroy Thompson MD;  Location: [facility];  Service: ENT    TUBAL COAGULATION LAPAROSCOPIC         Family History  Family History   Problem Relation Age of Onset    Hypertension Mother     Heart disease Mother         CHF    Diabetes Mother     Cancer Mother         Lung    Heart disease Father         heart attack    Hyperlipidemia Sister     Hypertension Sister     Diabetes Sister     Cancer Maternal Aunt         breast    Breast cancer Maternal Aunt     Cancer Maternal Aunt         cervical       Social History  Social History     Socioeconomic History    Marital status:    Tobacco Use    Smoking status: Never    Smokeless tobacco: Never   Vaping Use    Vaping Use: Never used   Substance and Sexual Activity    Alcohol use: No    " "Drug use: No    Sexual activity: Defer       Objective   Vital Signs:   /74 (BP Location: Right arm, Patient Position: Sitting, Cuff Size: Adult)   Pulse 75   Temp 97.3 °F (36.3 °C) (Temporal)   Ht 157.5 cm (62\")   Wt 93.9 kg (207 lb)   SpO2 97%   BMI 37.86 kg/m²     Physical Exam     Gen: Patient in NAD. Pleasant and answers appropriately. A&Ox3.    Skin: Warm and dry with normal turgor. No purpura, rashes, or unusual pigmentation noted. Hair is normal in appearance and distribution.    HEENT: NC/AT. No lesions noted. Conjunctiva clear, sclera nonicteric. PERRL. EOMI without nystagmus or strabismus. Fundi appear benign. No hemorrhages or exudates of eyes. Auditory canals are patent bilaterally without lesions. TMs intact,  nonerythematous, nonbulging without lesions. Nasal mucosa pink, nonerythematous, and nonedematous. Frontal and maxillary sinuses are nontender. O/P nonerythematous and moist without exudate.    Neck: Supple without lymph nodes palpated. FROM.     Lungs: Decreased B/L without rales, rhonchi, crackles, or wheezes.    Heart: RRR. S1 and S2 normal. No S3 or S4. No MRGT.    Abd: Soft, nontender,nondistended. (+)BSx4 quadrants.     Extrem: No CCE. Radial pulses 2+/4 and equal B/L. FROMx4. No bone, joint, or muscle tenderness noted.    Neuro: No focal motor/sensory deficits.    Procedures    Result Review :   The following data was reviewed by: Terri Hopson MD on 02/13/2024:             The 10-year ASCVD risk score (Timothy ATKINSON, et al., 2019) is: 1.3%    Values used to calculate the score:      Age: 48 years      Sex: Female      Is Non- : No      Diabetic: No      Tobacco smoker: No      Systolic Blood Pressure: 122 mmHg      Is BP treated: No      HDL Cholesterol: 42 mg/dL      Total Cholesterol: 168 mg/dL         Assessment and Plan    Malgorzata Lopez Tony is a 48 y.o. here for establishment of care.    Diagnoses and all orders for this visit:    1. " Gastroesophageal reflux disease without esophagitis (Primary)  -     Comprehensive Metabolic Panel; Future  -     Comprehensive Metabolic Panel    2. Encounter for screening colonoscopy  -     Ambulatory Referral For Screening Colonoscopy    3. Seasonal allergic rhinitis due to other allergic trigger  Encouraged saline rinse.    4. Body mass index (BMI) of 37.0 to 37.9 in adult  Discussed dangers of phentermine.    5. Hypothyroidism, unspecified type  Currently on NP thyroid 15 mg orally daily.        Problem List Items Addressed This Visit    None  Visit Diagnoses       Encounter for screening colonoscopy    -  Primary    Relevant Orders    Ambulatory Referral For Screening Colonoscopy (Completed)    Gastroesophageal reflux disease without esophagitis        Relevant Medications    esomeprazole (nexIUM) 20 MG capsule    Other Relevant Orders    Comprehensive Metabolic Panel            1. GERD.  - I advised the patient to take Nexium as soon as she gets off work or even at dinner to see if it is better. If she has episodes during the day, then I want her to try it twice a day. If not, then we are going to talk about other things.    2. Hypothyroidism.  - I advised the patient to split it into fourth and then do every other day. When she weans herself off of it, she will let me know.    3. Right knee pain.  - The patient declines surgery at this time.    4. Constipation.  - I will refer the patient for a colonoscopy.    5. Health maintenance.  - I will order blood work to check her liver, kidneys, and electrolytes.  - I advised the patient to get the shingles vaccine.              Follow Up   Return in about 3 months (around 5/13/2024), or LABS.  Findings and plans discussed with patient who verbalizes understanding and agreement. Will followup with patient once results are in. Patient was given instructions and counseling regarding her condition or for health maintenance advice. Please see specific information  pulled into the AVS if appropriate.       Terri Hopson MD    Transcribed from ambient dictation for Terri Hopson MD by Pavel Greene.  02/13/24   10:39 EST    Patient or patient representative verbalized consent to the visit recording.  I have personally performed the services described in this document as transcribed by the above individual, and it is both accurate and complete.

## 2024-02-14 ENCOUNTER — TELEPHONE (OUTPATIENT)
Dept: FAMILY MEDICINE CLINIC | Facility: CLINIC | Age: 49
End: 2024-02-14
Payer: COMMERCIAL

## 2024-02-14 RX ORDER — SEMAGLUTIDE 2.4 MG/.75ML
INJECTION, SOLUTION SUBCUTANEOUS
Qty: 9 ML | Refills: 0 | Status: SHIPPED | OUTPATIENT
Start: 2024-02-14

## 2024-02-15 ENCOUNTER — TELEPHONE (OUTPATIENT)
Dept: FAMILY MEDICINE CLINIC | Facility: CLINIC | Age: 49
End: 2024-02-15
Payer: COMMERCIAL

## 2024-05-02 RX ORDER — SEMAGLUTIDE 2.4 MG/.75ML
INJECTION, SOLUTION SUBCUTANEOUS
Qty: 9 ML | Refills: 0 | Status: SHIPPED | OUTPATIENT
Start: 2024-05-02

## 2024-06-28 RX ORDER — SEMAGLUTIDE 2.4 MG/.75ML
INJECTION, SOLUTION SUBCUTANEOUS
Qty: 9 ML | Refills: 0 | OUTPATIENT
Start: 2024-06-28

## 2024-07-03 ENCOUNTER — TELEPHONE (OUTPATIENT)
Dept: FAMILY MEDICINE CLINIC | Facility: CLINIC | Age: 49
End: 2024-07-03
Payer: COMMERCIAL

## 2024-07-03 RX ORDER — SEMAGLUTIDE 2.4 MG/.75ML
INJECTION, SOLUTION SUBCUTANEOUS
Qty: 9 ML | Refills: 0 | Status: CANCELLED | OUTPATIENT
Start: 2024-07-03

## 2024-07-03 NOTE — TELEPHONE ENCOUNTER
Caller: Malgorzata Jimenez    Relationship: Self    Best call back number:     Which medication are you concerned about: WEGOVY     Who prescribed you this medication: KARSON KHAN    What are your concerns: THE INSURANCE WILL NOT PAY FOR THIS MEDICATION AFTER THE END OF JULY AND THE MEDICATION HAS BEEN DENIED BY THE PROVIDER DUE TO NEEDING AN APPT. .  THE PATIENT HAS AN APPT FOR JULY 30TH.  THE PATIENT WOULD LIKE TO GET IN SOONER ON A MON OR TUE.  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 THERE IS NOTHING AVAILABLE  IN [facility] PRIOR TO JULY 30TH.    THE PATIENT WOULD LIKE A 90 DAY SUPPLY CALLED IN DUE TO INSURANCE NOT PAYING FOR IT AFTER JULY.     How long have you had these concerns: PLEASE CALL THE PATIENT AND LET HER KNOW IF ANYTHING CAN BE DONE.

## 2024-07-10 RX ORDER — SEMAGLUTIDE 2.4 MG/.75ML
INJECTION, SOLUTION SUBCUTANEOUS
Qty: 9 ML | Refills: 0 | OUTPATIENT
Start: 2024-07-10

## 2024-07-16 ENCOUNTER — LAB (OUTPATIENT)
Dept: FAMILY MEDICINE CLINIC | Facility: CLINIC | Age: 49
End: 2024-07-16
Payer: COMMERCIAL

## 2024-07-16 ENCOUNTER — OFFICE VISIT (OUTPATIENT)
Dept: FAMILY MEDICINE CLINIC | Facility: CLINIC | Age: 49
End: 2024-07-16
Payer: COMMERCIAL

## 2024-07-16 VITALS
WEIGHT: 219.8 LBS | DIASTOLIC BLOOD PRESSURE: 60 MMHG | RESPIRATION RATE: 16 BRPM | OXYGEN SATURATION: 97 % | TEMPERATURE: 96.2 F | HEART RATE: 81 BPM | HEIGHT: 62 IN | BODY MASS INDEX: 40.45 KG/M2 | SYSTOLIC BLOOD PRESSURE: 110 MMHG

## 2024-07-16 DIAGNOSIS — E03.9 HYPOTHYROIDISM, UNSPECIFIED TYPE: ICD-10-CM

## 2024-07-16 DIAGNOSIS — K21.9 GASTROESOPHAGEAL REFLUX DISEASE WITHOUT ESOPHAGITIS: ICD-10-CM

## 2024-07-16 DIAGNOSIS — E66.01 MORBID (SEVERE) OBESITY DUE TO EXCESS CALORIES: ICD-10-CM

## 2024-07-16 DIAGNOSIS — F33.41 RECURRENT MAJOR DEPRESSIVE DISORDER, IN PARTIAL REMISSION: ICD-10-CM

## 2024-07-16 DIAGNOSIS — R53.83 OTHER FATIGUE: ICD-10-CM

## 2024-07-16 DIAGNOSIS — K21.9 GASTROESOPHAGEAL REFLUX DISEASE WITHOUT ESOPHAGITIS: Primary | ICD-10-CM

## 2024-07-16 LAB
ALBUMIN SERPL-MCNC: 3.9 G/DL (ref 3.5–5.2)
ALBUMIN/GLOB SERPL: 1.3 G/DL
ALP SERPL-CCNC: 75 U/L (ref 39–117)
ALT SERPL W P-5'-P-CCNC: 18 U/L (ref 1–33)
ANION GAP SERPL CALCULATED.3IONS-SCNC: 8 MMOL/L (ref 5–15)
AST SERPL-CCNC: 17 U/L (ref 1–32)
BILIRUB SERPL-MCNC: 0.2 MG/DL (ref 0–1.2)
BUN SERPL-MCNC: 15 MG/DL (ref 6–20)
BUN/CREAT SERPL: 16.3 (ref 7–25)
CALCIUM SPEC-SCNC: 9 MG/DL (ref 8.6–10.5)
CHLORIDE SERPL-SCNC: 103 MMOL/L (ref 98–107)
CO2 SERPL-SCNC: 26 MMOL/L (ref 22–29)
CREAT SERPL-MCNC: 0.92 MG/DL (ref 0.57–1)
EGFRCR SERPLBLD CKD-EPI 2021: 76 ML/MIN/1.73
GLOBULIN UR ELPH-MCNC: 2.9 GM/DL
GLUCOSE SERPL-MCNC: 75 MG/DL (ref 65–99)
POTASSIUM SERPL-SCNC: 4.2 MMOL/L (ref 3.5–5.2)
PROT SERPL-MCNC: 6.8 G/DL (ref 6–8.5)
SODIUM SERPL-SCNC: 137 MMOL/L (ref 136–145)
T4 FREE SERPL-MCNC: 1 NG/DL (ref 0.92–1.68)
TSH SERPL DL<=0.05 MIU/L-ACNC: 1.7 UIU/ML (ref 0.27–4.2)

## 2024-07-16 PROCEDURE — 84439 ASSAY OF FREE THYROXINE: CPT | Performed by: FAMILY MEDICINE

## 2024-07-16 PROCEDURE — 80053 COMPREHEN METABOLIC PANEL: CPT | Performed by: FAMILY MEDICINE

## 2024-07-16 PROCEDURE — 99214 OFFICE O/P EST MOD 30 MIN: CPT | Performed by: FAMILY MEDICINE

## 2024-07-16 PROCEDURE — 84443 ASSAY THYROID STIM HORMONE: CPT | Performed by: FAMILY MEDICINE

## 2024-07-16 PROCEDURE — 36415 COLL VENOUS BLD VENIPUNCTURE: CPT

## 2024-07-16 RX ORDER — SEMAGLUTIDE 2.4 MG/.75ML
INJECTION, SOLUTION SUBCUTANEOUS
Qty: 9 ML | Refills: 0 | Status: SHIPPED | OUTPATIENT
Start: 2024-07-16

## 2024-07-16 RX ORDER — DESVENLAFAXINE SUCCINATE 50 MG/1
50 TABLET, EXTENDED RELEASE ORAL DAILY
Qty: 30 TABLET | Refills: 0 | Status: SHIPPED | OUTPATIENT
Start: 2024-07-16

## 2024-07-31 NOTE — PROGRESS NOTES
"Malgorzata Jimenez     VITALS: Blood pressure 110/60, pulse 81, temperature 96.2 °F (35.7 °C), temperature source Temporal, resp. rate 16, height 157.5 cm (62\"), weight 99.7 kg (219 lb 12.8 oz), SpO2 97%.    Subjective  Chief Complaint  Obesity and Depression    Subjective          History of Present Illness:  Patient is a 48 y.o.  female with medical conditions significant for GERD and hypothyroidism who presents to clinic secondary to medical followup.     Patient is having some trouble with her depression.  Patient is currently on Lexapro 20 mg orally daily.  She is wanting to switch as she states that it decreases her libido.  She is wondering if there is something better for her.    Patient complains of fatigue.  She wonders if she has sleep apnea.  She states that she snores.  She does wake up suddenly.  She states that she stays tired throughout the day.  She would take a nap if she could.    Recently had her colonoscopy done and they found polyps.  This was done in April 2024 by Dr. Simon.  Recommendation is to follow-up in 7 to 10 years.    No complaints about any of the medications.    The following portions of the patient's history were reviewed and updated as appropriate: allergies, current medications, past family history, past medical history, past social history, past surgical history and problem list.    Past Medical History  Past Medical History:   Diagnosis Date    Allergic 4/20/2018    Environmental and penicillin    Allergic rhinitis due to pollen     Arthritis     Bilateral ovarian cysts     Depression     Deviated septum     Diverticulosis 4-    GERD (gastroesophageal reflux disease)     Headache     Hypothyroidism 10/15/2020    Thyroid disease        Surgical History  Past Surgical History:   Procedure Laterality Date    CHOLECYSTECTOMY  7/10/2015    COLONOSCOPY  4-    GALLBLADDER SURGERY      SEPTOPLASTY, RESECTION INFERIOR TURBINATES Bilateral 03/11/2019    Procedure: " "SEPTOPLASTY;  Surgeon: Leroy Thompson MD;  Location: [facility];  Service: ENT    SINUS SURGERY  6/20/2020    TUBAL ABDOMINAL LIGATION  5/25/2012    TUBAL COAGULATION LAPAROSCOPIC         Family History  Family History   Problem Relation Age of Onset    Hypertension Mother     Heart disease Mother         CHF    Diabetes Mother     Cancer Mother         Lung Cancer    Depression Mother     Hyperlipidemia Mother     Heart disease Father         Heart attack    Hyperlipidemia Sister     Hypertension Sister     Diabetes Sister     Cancer Maternal Aunt         Cervical cancer    Breast cancer Maternal Aunt     Cancer Maternal Aunt         Breast cancer    Diabetes Sister     Hyperlipidemia Sister        Social History  Social History     Socioeconomic History    Marital status:    Tobacco Use    Smoking status: Never    Smokeless tobacco: Never   Vaping Use    Vaping status: Never Used   Substance and Sexual Activity    Alcohol use: No    Drug use: No    Sexual activity: Defer       Objective   Vital Signs:   /60 (BP Location: Right arm, Patient Position: Sitting, Cuff Size: Large Adult)   Pulse 81   Temp 96.2 °F (35.7 °C) (Temporal)   Resp 16   Ht 157.5 cm (62\")   Wt 99.7 kg (219 lb 12.8 oz)   SpO2 97%   BMI 40.20 kg/m²     Physical Exam     Gen: Patient in NAD. Pleasant and answers appropriately. A&Ox3.    Skin: Warm and dry with normal turgor. No purpura, rashes, or unusual pigmentation noted. Hair is normal in appearance and distribution.    HEENT: NC/AT. No lesions noted. Conjunctiva clear, sclera nonicteric. PERRL. EOMI without nystagmus or strabismus. Fundi appear benign. No hemorrhages or exudates of eyes. Auditory canals are patent bilaterally without lesions. TMs intact,  nonerythematous, bulging without lesions. Nasal mucosa pink, nonerythematous, and nonedematous. Frontal and maxillary sinuses are nontender. O/P erythematous and moist without exudate.    Neck: Supple without lymph " nodes palpated. FROM.     Lungs: Decreased B/L without rales, rhonchi, crackles, or wheezes.    Heart: RRR. S1 and S2 normal. No S3 or S4. No MRGT.    Abd: Soft, nontender, slightly distended. (+)BSx4 quadrants.     Extrem: No CCE. Radial pulses 2+/4 and equal B/L. FROMx4. No bone, joint, or muscle tenderness noted.    Neuro: No focal motor/sensory deficits.    Procedures    Result Review :   The following data was reviewed by: Terri Hopson MD on 07/16/2024:                Assessment and Plan    Malgorzata Jimenez is a 48 y.o. here for medical followup.    Diagnoses and all orders for this visit:    1. Gastroesophageal reflux disease without esophagitis (Primary)  -     esomeprazole (nexIUM) 20 MG capsule; Take 1 capsule by mouth Every Morning Before Breakfast.  Dispense: 90 capsule; Refill: 1  -     Comprehensive Metabolic Panel; Future    2. Hypothyroidism, unspecified type  -     Comprehensive Metabolic Panel; Future  -     T4, Free; Future  -     TSH; Future    3. Recurrent major depressive disorder, in partial remission  -     desvenlafaxine (PRISTIQ) 50 MG 24 hr tablet; Take 1 tablet by mouth Daily.  Dispense: 30 tablet; Refill: 0  -     Comprehensive Metabolic Panel; Future    4. Other fatigue  -     Ambulatory Referral to Sleep Medicine    5.  Obesity  -     Semaglutide-Weight Management (Wegovy) 2.4 MG/0.75ML solution auto-injector; Inject 2.4 mg below the skin once weekly  Dispense: 9 mL; Refill: 0          Problem List Items Addressed This Visit    None  Visit Diagnoses       Gastroesophageal reflux disease without esophagitis    -  Primary    Relevant Medications    esomeprazole (nexIUM) 20 MG capsule    Other Relevant Orders    Comprehensive Metabolic Panel (Completed)    Hypothyroidism, unspecified type        Relevant Orders    Comprehensive Metabolic Panel (Completed)    T4, Free (Completed)    TSH (Completed)    Recurrent major depressive disorder, in partial remission        Relevant  Medications    Semaglutide-Weight Management (Wegovy) 2.4 MG/0.75ML solution auto-injector    desvenlafaxine (PRISTIQ) 50 MG 24 hr tablet    Other Relevant Orders    Comprehensive Metabolic Panel (Completed)    Other fatigue        Relevant Orders    Ambulatory Referral to Sleep Medicine (Completed)              Follow Up   Return in about 3 months (around 10/16/2024), or LABS ([facility] Dr. Simon - Colonoscopy and path results 4/2024).  Findings and plans discussed with patient who verbalizes understanding and agreement. Will followup with patient once results are in. Patient was given instructions and counseling regarding her condition or for health maintenance advice. Please see specific information pulled into the AVS if appropriate.       Terri Hopson MD

## 2024-08-09 DIAGNOSIS — F33.41 RECURRENT MAJOR DEPRESSIVE DISORDER, IN PARTIAL REMISSION: ICD-10-CM

## 2024-08-13 ENCOUNTER — TELEPHONE (OUTPATIENT)
Dept: FAMILY MEDICINE CLINIC | Facility: CLINIC | Age: 49
End: 2024-08-13
Payer: COMMERCIAL

## 2024-08-13 RX ORDER — DESVENLAFAXINE SUCCINATE 50 MG/1
50 TABLET, EXTENDED RELEASE ORAL DAILY
Qty: 30 TABLET | Refills: 2 | Status: SHIPPED | OUTPATIENT
Start: 2024-08-13 | End: 2024-08-16 | Stop reason: SDUPTHER

## 2024-08-13 NOTE — TELEPHONE ENCOUNTER
Caller: Malgorzata Jimenez    Relationship: Self    Best call back number: [phone]    Requested Prescriptions:   Requested Prescriptions      No prescriptions requested or ordered in this encounter      desvenlafaxine (PRISTIQ) 50 MG 24 hr tablet     Pharmacy where request should be sent: [facility]      Last office visit with prescribing clinician: 7/16/2024   Last telemedicine visit with prescribing clinician: Visit date not found   Next office visit with prescribing clinician: 10/28/2024     Additional details provided by patient:     SHE LIKES THIS BETTER THAN LEXAPRO    Does the patient have less than a 3 day supply:  [x] Yes  [] No    Would you like a call back once the refill request has been completed: [] Yes [x] No    If the office needs to give you a call back, can they leave a voicemail: [] Yes [x] No    Salome Lau, PCT   08/13/24 12:38 EDT

## 2024-08-16 DIAGNOSIS — F33.41 RECURRENT MAJOR DEPRESSIVE DISORDER, IN PARTIAL REMISSION: ICD-10-CM

## 2024-08-16 RX ORDER — DESVENLAFAXINE SUCCINATE 50 MG/1
50 TABLET, EXTENDED RELEASE ORAL DAILY
Qty: 30 TABLET | Refills: 2 | Status: SHIPPED | OUTPATIENT
Start: 2024-08-16

## 2024-09-25 ENCOUNTER — OFFICE VISIT (OUTPATIENT)
Dept: PULMONOLOGY | Facility: CLINIC | Age: 49
End: 2024-09-25
Payer: COMMERCIAL

## 2024-09-25 VITALS
SYSTOLIC BLOOD PRESSURE: 128 MMHG | WEIGHT: 235.4 LBS | TEMPERATURE: 96.7 F | OXYGEN SATURATION: 97 % | DIASTOLIC BLOOD PRESSURE: 82 MMHG | BODY MASS INDEX: 43.32 KG/M2 | HEART RATE: 92 BPM | HEIGHT: 62 IN

## 2024-09-25 DIAGNOSIS — K21.9 GASTROESOPHAGEAL REFLUX DISEASE, UNSPECIFIED WHETHER ESOPHAGITIS PRESENT: ICD-10-CM

## 2024-09-25 DIAGNOSIS — E66.01 MORBID OBESITY WITH BMI OF 40.0-44.9, ADULT: ICD-10-CM

## 2024-09-25 DIAGNOSIS — J30.1 ALLERGIC RHINITIS DUE TO POLLEN, UNSPECIFIED SEASONALITY: ICD-10-CM

## 2024-09-25 DIAGNOSIS — G47.33 OSA (OBSTRUCTIVE SLEEP APNEA): Primary | ICD-10-CM

## 2024-09-27 ENCOUNTER — PATIENT ROUNDING (BHMG ONLY) (OUTPATIENT)
Dept: PULMONOLOGY | Facility: CLINIC | Age: 49
End: 2024-09-27
Payer: COMMERCIAL

## 2024-10-22 ENCOUNTER — TRANSCRIBE ORDERS (OUTPATIENT)
Dept: ADMINISTRATIVE | Facility: HOSPITAL | Age: 49
End: 2024-10-22
Payer: COMMERCIAL

## 2024-10-22 DIAGNOSIS — Z12.31 VISIT FOR SCREENING MAMMOGRAM: Primary | ICD-10-CM

## 2024-11-06 ENCOUNTER — TRANSCRIBE ORDERS (OUTPATIENT)
Dept: ADMINISTRATIVE | Facility: HOSPITAL | Age: 49
End: 2024-11-06
Payer: COMMERCIAL

## 2024-11-06 DIAGNOSIS — R00.2 PALPITATIONS: Primary | ICD-10-CM

## 2024-12-04 ENCOUNTER — LAB (OUTPATIENT)
Dept: LAB | Facility: HOSPITAL | Age: 49
End: 2024-12-04
Payer: COMMERCIAL

## 2024-12-04 ENCOUNTER — HOSPITAL ENCOUNTER (OUTPATIENT)
Dept: MAMMOGRAPHY | Facility: HOSPITAL | Age: 49
Discharge: HOME | End: 2024-12-04
Payer: COMMERCIAL

## 2024-12-04 ENCOUNTER — TRANSCRIBE ORDERS (OUTPATIENT)
Dept: ADMINISTRATIVE | Facility: HOSPITAL | Age: 49
End: 2024-12-04
Payer: COMMERCIAL

## 2024-12-04 ENCOUNTER — HOSPITAL ENCOUNTER (OUTPATIENT)
Dept: RESPIRATORY THERAPY | Facility: HOSPITAL | Age: 49
Discharge: HOME | End: 2024-12-04
Payer: COMMERCIAL

## 2024-12-04 DIAGNOSIS — Z13.6 SCREENING FOR ISCHEMIC HEART DISEASE: Primary | ICD-10-CM

## 2024-12-04 DIAGNOSIS — D51.9 ANEMIA DUE TO VITAMIN B12 DEFICIENCY, UNSPECIFIED B12 DEFICIENCY TYPE: ICD-10-CM

## 2024-12-04 DIAGNOSIS — Z13.220 SCREENING FOR LIPOID DISORDERS: ICD-10-CM

## 2024-12-04 DIAGNOSIS — Z13.6 SCREENING FOR ISCHEMIC HEART DISEASE: ICD-10-CM

## 2024-12-04 DIAGNOSIS — F33.41 RECURRENT MAJOR DEPRESSIVE DISORDER, IN PARTIAL REMISSION: ICD-10-CM

## 2024-12-04 DIAGNOSIS — R00.2 PALPITATIONS: ICD-10-CM

## 2024-12-04 DIAGNOSIS — Z12.31 VISIT FOR SCREENING MAMMOGRAM: ICD-10-CM

## 2024-12-04 LAB
25(OH)D3 SERPL-MCNC: 60.4 NG/ML (ref 30–100)
ALBUMIN SERPL-MCNC: 4 G/DL (ref 3.5–5.2)
ALBUMIN/GLOB SERPL: 1.3 G/DL
ALP SERPL-CCNC: 102 U/L (ref 39–117)
ALT SERPL W P-5'-P-CCNC: 222 U/L (ref 1–33)
ANION GAP SERPL CALCULATED.3IONS-SCNC: 11.6 MMOL/L (ref 5–15)
AST SERPL-CCNC: 154 U/L (ref 1–32)
BASOPHILS # BLD AUTO: 0.01 10*3/MM3 (ref 0–0.2)
BASOPHILS NFR BLD AUTO: 0.1 % (ref 0–1.5)
BILIRUB SERPL-MCNC: 0.3 MG/DL (ref 0–1.2)
BUN SERPL-MCNC: 10 MG/DL (ref 6–20)
BUN/CREAT SERPL: 11 (ref 7–25)
CALCIUM SPEC-SCNC: 8.9 MG/DL (ref 8.6–10.5)
CHLORIDE SERPL-SCNC: 103 MMOL/L (ref 98–107)
CHOLEST SERPL-MCNC: 167 MG/DL (ref 0–200)
CHROMATIN AB SERPL-ACNC: <10 IU/ML (ref 0–14)
CO2 SERPL-SCNC: 23.4 MMOL/L (ref 22–29)
CORTIS SERPL-MCNC: 9.42 MCG/DL
CREAT SERPL-MCNC: 0.91 MG/DL (ref 0.57–1)
DEPRECATED RDW RBC AUTO: 40.5 FL (ref 37–54)
EGFRCR SERPLBLD CKD-EPI 2021: 76.5 ML/MIN/1.73
EOSINOPHIL # BLD AUTO: 0 10*3/MM3 (ref 0–0.4)
EOSINOPHIL NFR BLD AUTO: 0 % (ref 0.3–6.2)
ERYTHROCYTE [DISTWIDTH] IN BLOOD BY AUTOMATED COUNT: 13.1 % (ref 12.3–15.4)
ERYTHROCYTE [SEDIMENTATION RATE] IN BLOOD: 7 MM/HR (ref 0–30)
ESTRADIOL SERPL HS-MCNC: 1142 PG/ML
FSH SERPL-ACNC: 7.54 MIU/ML
GLOBULIN UR ELPH-MCNC: 3 GM/DL
GLUCOSE SERPL-MCNC: 104 MG/DL (ref 65–99)
HBA1C MFR BLD: 5.5 % (ref 4.8–5.6)
HCT VFR BLD AUTO: 40.6 % (ref 34–46.6)
HDLC SERPL-MCNC: 42 MG/DL (ref 40–60)
HGB BLD-MCNC: 13.5 G/DL (ref 12–15.9)
IMM GRANULOCYTES # BLD AUTO: 0.02 10*3/MM3 (ref 0–0.05)
IMM GRANULOCYTES NFR BLD AUTO: 0.3 % (ref 0–0.5)
IRON 24H UR-MRATE: 58 MCG/DL (ref 37–145)
IRON SATN MFR SERPL: 11 % (ref 20–50)
LDLC SERPL CALC-MCNC: 99 MG/DL (ref 0–100)
LDLC/HDLC SERPL: 2.28 {RATIO}
LYMPHOCYTES # BLD AUTO: 2.27 10*3/MM3 (ref 0.7–3.1)
LYMPHOCYTES NFR BLD AUTO: 32.7 % (ref 19.6–45.3)
MAGNESIUM SERPL-MCNC: 2 MG/DL (ref 1.6–2.6)
MCH RBC QN AUTO: 28.3 PG (ref 26.6–33)
MCHC RBC AUTO-ENTMCNC: 33.3 G/DL (ref 31.5–35.7)
MCV RBC AUTO: 85.1 FL (ref 79–97)
MONOCYTES # BLD AUTO: 0.4 10*3/MM3 (ref 0.1–0.9)
MONOCYTES NFR BLD AUTO: 5.8 % (ref 5–12)
NEUTROPHILS NFR BLD AUTO: 4.24 10*3/MM3 (ref 1.7–7)
NEUTROPHILS NFR BLD AUTO: 61.1 % (ref 42.7–76)
NRBC BLD AUTO-RTO: 0 /100 WBC (ref 0–0.2)
PLATELET # BLD AUTO: 295 10*3/MM3 (ref 140–450)
PMV BLD AUTO: 11 FL (ref 6–12)
POTASSIUM SERPL-SCNC: 4.1 MMOL/L (ref 3.5–5.2)
PROGEST SERPL-MCNC: 0.72 NG/ML
PROLACTIN SERPL-MCNC: 17.4 NG/ML (ref 4.79–23.3)
PROT SERPL-MCNC: 7 G/DL (ref 6–8.5)
RBC # BLD AUTO: 4.77 10*6/MM3 (ref 3.77–5.28)
SODIUM SERPL-SCNC: 138 MMOL/L (ref 136–145)
T3 SERPL-MCNC: 175 NG/DL (ref 80–200)
T3FREE SERPL-MCNC: 3.32 PG/ML (ref 2–4.4)
T4 FREE SERPL-MCNC: 1 NG/DL (ref 0.92–1.68)
T4 SERPL-MCNC: 8.24 MCG/DL (ref 4.5–11.7)
TIBC SERPL-MCNC: 532 MCG/DL (ref 298–536)
TRANSFERRIN SERPL-MCNC: 357 MG/DL (ref 200–360)
TRIGL SERPL-MCNC: 146 MG/DL (ref 0–150)
TSH SERPL DL<=0.05 MIU/L-ACNC: 3.4 UIU/ML (ref 0.27–4.2)
VIT B12 BLD-MCNC: 1133 PG/ML (ref 211–946)
VLDLC SERPL-MCNC: 26 MG/DL (ref 5–40)
WBC NRBC COR # BLD AUTO: 6.94 10*3/MM3 (ref 3.4–10.8)

## 2024-12-04 PROCEDURE — 83001 ASSAY OF GONADOTROPIN (FSH): CPT

## 2024-12-04 PROCEDURE — 86235 NUCLEAR ANTIGEN ANTIBODY: CPT

## 2024-12-04 PROCEDURE — 36415 COLL VENOUS BLD VENIPUNCTURE: CPT

## 2024-12-04 PROCEDURE — 80050 GENERAL HEALTH PANEL: CPT

## 2024-12-04 PROCEDURE — 83735 ASSAY OF MAGNESIUM: CPT

## 2024-12-04 PROCEDURE — 86038 ANTINUCLEAR ANTIBODIES: CPT

## 2024-12-04 PROCEDURE — 77063 BREAST TOMOSYNTHESIS BI: CPT

## 2024-12-04 PROCEDURE — 77067 SCR MAMMO BI INCL CAD: CPT | Performed by: RADIOLOGY

## 2024-12-04 PROCEDURE — 86800 THYROGLOBULIN ANTIBODY: CPT

## 2024-12-04 PROCEDURE — 93242 EXT ECG>48HR<7D RECORDING: CPT

## 2024-12-04 PROCEDURE — 84481 FREE ASSAY (FT-3): CPT

## 2024-12-04 PROCEDURE — 77067 SCR MAMMO BI INCL CAD: CPT

## 2024-12-04 PROCEDURE — 85652 RBC SED RATE AUTOMATED: CPT

## 2024-12-04 PROCEDURE — 86200 CCP ANTIBODY: CPT

## 2024-12-04 PROCEDURE — 84439 ASSAY OF FREE THYROXINE: CPT

## 2024-12-04 PROCEDURE — 82670 ASSAY OF TOTAL ESTRADIOL: CPT

## 2024-12-04 PROCEDURE — 84144 ASSAY OF PROGESTERONE: CPT

## 2024-12-04 PROCEDURE — 82306 VITAMIN D 25 HYDROXY: CPT

## 2024-12-04 PROCEDURE — 86431 RHEUMATOID FACTOR QUANT: CPT

## 2024-12-04 PROCEDURE — 82627 DEHYDROEPIANDROSTERONE: CPT

## 2024-12-04 PROCEDURE — 83036 HEMOGLOBIN GLYCOSYLATED A1C: CPT

## 2024-12-04 PROCEDURE — 82533 TOTAL CORTISOL: CPT

## 2024-12-04 PROCEDURE — 83540 ASSAY OF IRON: CPT

## 2024-12-04 PROCEDURE — 84146 ASSAY OF PROLACTIN: CPT

## 2024-12-04 PROCEDURE — 82607 VITAMIN B-12: CPT

## 2024-12-04 PROCEDURE — 77063 BREAST TOMOSYNTHESIS BI: CPT | Performed by: RADIOLOGY

## 2024-12-04 PROCEDURE — 80061 LIPID PANEL: CPT

## 2024-12-04 PROCEDURE — 84466 ASSAY OF TRANSFERRIN: CPT

## 2024-12-04 PROCEDURE — 86376 MICROSOMAL ANTIBODY EACH: CPT

## 2024-12-04 RX ORDER — DESVENLAFAXINE 50 MG/1
50 TABLET, FILM COATED, EXTENDED RELEASE ORAL DAILY
Qty: 90 TABLET | Refills: 0 | Status: SHIPPED | OUTPATIENT
Start: 2024-12-04

## 2024-12-05 LAB
CCP IGA+IGG SERPL IA-ACNC: 22 UNITS (ref 0–19)
DHEA-S SERPL-MCNC: 34.6 UG/DL (ref 41.2–243.7)
ENA SCL70 AB SER-ACNC: <0.2 AI (ref 0–0.9)
ENA SS-A AB SER-ACNC: <0.2 AI (ref 0–0.9)
ENA SS-B AB SER-ACNC: <0.2 AI (ref 0–0.9)
THYROGLOB AB SERPL-ACNC: <1 IU/ML (ref 0–0.9)
THYROPEROXIDASE AB SERPL-ACNC: <9 IU/ML (ref 0–34)

## 2024-12-06 LAB
ANA SER QL IF: NEGATIVE
LABORATORY COMMENT REPORT: NORMAL

## 2024-12-09 ENCOUNTER — TRANSCRIBE ORDERS (OUTPATIENT)
Dept: ADMINISTRATIVE | Facility: HOSPITAL | Age: 49
End: 2024-12-09
Payer: COMMERCIAL

## 2024-12-09 DIAGNOSIS — R94.5 NONSPECIFIC ABNORMAL RESULTS OF LIVER FUNCTION STUDY: Primary | ICD-10-CM

## 2024-12-11 ENCOUNTER — LAB (OUTPATIENT)
Dept: LAB | Facility: HOSPITAL | Age: 49
End: 2024-12-11
Payer: COMMERCIAL

## 2024-12-11 ENCOUNTER — TRANSCRIBE ORDERS (OUTPATIENT)
Dept: ADMINISTRATIVE | Facility: HOSPITAL | Age: 49
End: 2024-12-11
Payer: COMMERCIAL

## 2024-12-11 ENCOUNTER — HOSPITAL ENCOUNTER (OUTPATIENT)
Dept: ULTRASOUND IMAGING | Facility: HOSPITAL | Age: 49
Discharge: HOME | End: 2024-12-11
Payer: COMMERCIAL

## 2024-12-11 DIAGNOSIS — R94.5 NONSPECIFIC ABNORMAL RESULTS OF LIVER FUNCTION STUDY: ICD-10-CM

## 2024-12-11 DIAGNOSIS — R94.5 NONSPECIFIC ABNORMAL RESULTS OF LIVER FUNCTION STUDY: Primary | ICD-10-CM

## 2024-12-11 LAB
HAV IGM SERPL QL IA: NORMAL
HBV CORE IGM SERPL QL IA: NORMAL
HIV 1+2 AB+HIV1 P24 AG SERPL QL IA: NORMAL

## 2024-12-11 PROCEDURE — 76705 ECHO EXAM OF ABDOMEN: CPT

## 2024-12-11 PROCEDURE — 86709 HEPATITIS A IGM ANTIBODY: CPT

## 2024-12-11 PROCEDURE — 86803 HEPATITIS C AB TEST: CPT

## 2024-12-11 PROCEDURE — 87522 HEPATITIS C REVRS TRNSCRPJ: CPT

## 2024-12-11 PROCEDURE — 36415 COLL VENOUS BLD VENIPUNCTURE: CPT

## 2024-12-11 PROCEDURE — 86705 HEP B CORE ANTIBODY IGM: CPT

## 2024-12-11 PROCEDURE — G0432 EIA HIV-1/HIV-2 SCREEN: HCPCS

## 2024-12-12 LAB
HCV AB SERPL QL IA: NORMAL
HCV IGG SERPL QL IA: NON REACTIVE

## 2024-12-13 LAB
HCV RNA SERPL NAA+PROBE-ACNC: NORMAL IU/ML
REF LAB TEST REF RANGE: NORMAL

## 2024-12-18 LAB
CV ZIO BASELINE AVG BPM: 81
CV ZIO BASELINE BPM HIGH: 159
CV ZIO BASELINE BPM LOW: 60

## 2024-12-29 NOTE — PROGRESS NOTES
Physical Therapy Daily Treatment Note      Patient: Malgorzata Jimenez   : 1975  Referring practitioner: PREM Hale  Date of Initial Visit: Type: THERAPY  Noted: 2021  Today's Date: 2021  Patient seen for 7 sessions           Subjective Evaluation    History of Present Illness    Subjective comment: Patient reports 2/10 pain today; she notes that she will be seeing MD on 2021. Pain  Current pain ratin (pre-2/10, post-4/10)           Objective   See Exercise, Manual, and Modality Logs for complete treatment.       Assessment & Plan     Assessment  Assessment details: Therapy session consisted of there ex, manual therapy, cryotherapy, and ESTIM; no adverse reactions were noted with modalities.  There ex continue to focus on right knee ROM, LE strengthening, and stabilization.  Patient was able to complete full revolutions multiple times while on the LE bicycle.  Manual therapy performed at the right knee to promote decreased edema and pain.  Patient noted slight increase in pain at conclusion of therapy.  She will continue to be progressed per her tolerance and POC.        Visit Diagnoses:    ICD-10-CM ICD-9-CM   1. Acute pain of right knee  M25.561 719.46   2. Rupture of anterior cruciate ligament of right knee, subsequent encounter  S83.511D V58.89     844.2       Progress per Plan of Care and Progress strengthening /stabilization /functional activity           Timed:  Manual Therapy:    12     mins  69085;  Therapeutic Exercise:    35     mins  50949;     Neuromuscular Narda:        mins  21680;    Therapeutic Activity:          mins  52426;     Gait Training:           mins  06802;     Ultrasound:          mins  91338;    Electrical Stimulation:         mins  58393 ( );    Untimed:  Electrical Stimulation:     10    mins  10066 ( );  Mechanical Traction:         mins  51007;     Timed Treatment:   47   mins   Total Treatment:     57   mins  Elena Matamoros,  PT  Physical Therapist                   well appearing

## 2025-02-26 ENCOUNTER — OFFICE VISIT (OUTPATIENT)
Dept: FAMILY MEDICINE CLINIC | Age: 50
End: 2025-02-26
Payer: COMMERCIAL

## 2025-02-26 VITALS
HEIGHT: 62 IN | BODY MASS INDEX: 43.24 KG/M2 | RESPIRATION RATE: 18 BRPM | DIASTOLIC BLOOD PRESSURE: 84 MMHG | SYSTOLIC BLOOD PRESSURE: 150 MMHG | HEART RATE: 78 BPM | WEIGHT: 235 LBS

## 2025-02-26 DIAGNOSIS — E66.01 MORBID OBESITY WITH BMI OF 40.0-44.9, ADULT: Primary | ICD-10-CM

## 2025-02-26 DIAGNOSIS — K21.9 GASTROESOPHAGEAL REFLUX DISEASE WITHOUT ESOPHAGITIS: ICD-10-CM

## 2025-02-26 DIAGNOSIS — K76.0 NONALCOHOLIC FATTY LIVER: ICD-10-CM

## 2025-02-26 LAB
ALBUMIN SERPL-MCNC: 4.1 G/DL (ref 3.5–5.2)
ALBUMIN/GLOB SERPL: 1.4 G/DL
ALP SERPL-CCNC: 115 U/L (ref 39–117)
ALT SERPL W P-5'-P-CCNC: 31 U/L (ref 1–33)
ANION GAP SERPL CALCULATED.3IONS-SCNC: 9.1 MMOL/L (ref 5–15)
AST SERPL-CCNC: 29 U/L (ref 1–32)
BILIRUB SERPL-MCNC: 0.2 MG/DL (ref 0–1.2)
BUN SERPL-MCNC: 10 MG/DL (ref 6–20)
BUN/CREAT SERPL: 11.5 (ref 7–25)
CALCIUM SPEC-SCNC: 8.7 MG/DL (ref 8.6–10.5)
CHLORIDE SERPL-SCNC: 105 MMOL/L (ref 98–107)
CO2 SERPL-SCNC: 25.9 MMOL/L (ref 22–29)
CREAT SERPL-MCNC: 0.87 MG/DL (ref 0.57–1)
EGFRCR SERPLBLD CKD-EPI 2021: 80.8 ML/MIN/1.73
GLOBULIN UR ELPH-MCNC: 3 GM/DL
GLUCOSE SERPL-MCNC: 98 MG/DL (ref 65–99)
HBA1C MFR BLD: 5.9 % (ref 4.8–5.6)
POTASSIUM SERPL-SCNC: 4 MMOL/L (ref 3.5–5.2)
PROT SERPL-MCNC: 7.1 G/DL (ref 6–8.5)
SODIUM SERPL-SCNC: 140 MMOL/L (ref 136–145)

## 2025-02-26 PROCEDURE — 99214 OFFICE O/P EST MOD 30 MIN: CPT | Performed by: NURSE PRACTITIONER

## 2025-02-26 PROCEDURE — 83036 HEMOGLOBIN GLYCOSYLATED A1C: CPT | Performed by: NURSE PRACTITIONER

## 2025-02-26 PROCEDURE — 80053 COMPREHEN METABOLIC PANEL: CPT | Performed by: NURSE PRACTITIONER

## 2025-02-26 NOTE — PROGRESS NOTES
Chief Complaint  Obesity    Subjective            Obesity    Malgorzata Jimenez is a 49 y.o. female who presents to [facility] today with interest in the weight loss program with a BMI of Body mass index is 42.98 kg/m².. Patient is a candidate for this program due to BMI of Obese Class III extreme obesity: > or equal to 40kg/m2. Patient denies a personal history of pancreatitis. Patient denies a personal history of gastroparesis. Patient also denies a personal history of gallbladder disease.  Patient denies personal and  family history of medullary thyroid cancer and multiple endocrine neoplasia syndrome Type II. Previous diet and exercise plans attempted include trying the keto diet and also took Wegovy one year ago. On Wegovy, patient states that she tolerated the medication well and lost 40 lbs. She was able to manage her constipation while taking the Wegovy with Trulance. However patient has been off the medication for one year and has regained her weight and more. She was recently diagnosed with NAFL and was evaluated by the specialist. Her liver enzymes have since decreased when she did improve her diet. Patient states she lost 8lbs and her repeat labs at [facility] had come down. In addition to NAFL disease, patient has the comorbidities of GERD and RUFINA.     Review of Systems   Constitutional:  Positive for appetite change.   All other systems reviewed and are negative.       Past Medical History:   Diagnosis Date    Allergic 4/20/2018    Environmental and penicillin    Allergic rhinitis due to pollen     Arthritis     Bilateral ovarian cysts     Depression     Deviated septum     Diverticulosis 4-    GERD (gastroesophageal reflux disease)     Headache     Hypothyroidism 10/15/2020    Thyroid disease      Past Surgical History:   Procedure Laterality Date    CHOLECYSTECTOMY  7/10/2015    COLONOSCOPY  4-    GALLBLADDER SURGERY      SEPTOPLASTY, RESECTION INFERIOR  TURBINATES Bilateral 03/11/2019    Procedure: SEPTOPLASTY;  Surgeon: Leroy Thompson MD;  Location: [facility];  Service: ENT    SINUS SURGERY  6/20/2020    TUBAL ABDOMINAL LIGATION  5/25/2012    TUBAL COAGULATION LAPAROSCOPIC        Family History   Problem Relation Age of Onset    Hypertension Mother     Heart disease Mother         CHF    Diabetes Mother     Cancer Mother         Lung Cancer    Depression Mother     Hyperlipidemia Mother     Heart disease Father         Heart attack    Hyperlipidemia Sister     Hypertension Sister     Diabetes Sister     Cancer Maternal Aunt         Cervical cancer    Breast cancer Maternal Aunt     Cancer Maternal Aunt         Breast cancer    Diabetes Sister     Hyperlipidemia Sister       Social History     Socioeconomic History    Marital status:    Tobacco Use    Smoking status: Never     Passive exposure: Never    Smokeless tobacco: Never   Vaping Use    Vaping status: Never Used   Substance and Sexual Activity    Alcohol use: No    Drug use: No    Sexual activity: Defer      Allergies   Allergen Reactions    Penicillins Rash     PER ALLERGY TESTING      Current Outpatient Medications on File Prior to Visit   Medication Sig Dispense Refill    desvenlafaxine (PRISTIQ) 50 MG 24 hr tablet Take 1 tablet by mouth Daily. 90 tablet 0    desvenlafaxine (Pristiq) 50 MG 24 hr tablet Take 1 tablet by mouth Daily. 90 tablet 3    EPINEPHrine (EpiPen 2-Chema) 0.3 MG/0.3ML solution auto-injector injection Inject into the middle of the outer thigh and hold for 3 seconds as needed for severe allergic reaction, then call 911 if used. 2 each 0    esomeprazole (nexIUM) 20 MG capsule Take 1 capsule by mouth Daily for reflux 90 capsule 3    fluticasone (FLONASE) 50 MCG/ACT nasal spray USE 2 SPRAYS IN EACH NOSTRIL DAILY. 16 g 11    fluticasone (FLONASE) 50 MCG/ACT nasal spray Instill 2 sprays in each nostril once a day. 16 g 11    Plecanatide (Trulance) 3 MG tablet Take 1 tablet by mouth  "every morning. 30 tablet 11     No current facility-administered medications on file prior to visit.        The following portions of the patient's history were reviewed and updated as appropriate: allergies, current medications, past family history, past social history, past medical history, past surgical history and problem list.     Objective   Vital Signs:  Vitals:    02/26/25 0936   BP: 150/84   BP Location: Right arm   Patient Position: Sitting   Pulse: 78   Resp: 18   Weight: 107 kg (235 lb)   Height: 157.5 cm (62\")      Estimated body mass index is 42.98 kg/m² as calculated from the following:    Height as of this encounter: 157.5 cm (62\").    Weight as of this encounter: 107 kg (235 lb).       Class 3 Severe Obesity (BMI >=40). Obesity-related health conditions include the following: GERD and hepatic steatosis. Obesity is newly identified. BMI is is above average; BMI management plan is completed. We discussed portion control, increasing exercise, and compounded semaglutide .      Physical Exam  Vitals reviewed.   Constitutional:       Appearance: She is obese.   HENT:      Head: Normocephalic.      Nose: Nose normal.      Mouth/Throat:      Mouth: Mucous membranes are moist.      Pharynx: Oropharynx is clear.   Eyes:      Extraocular Movements: Extraocular movements intact.      Pupils: Pupils are equal, round, and reactive to light.   Cardiovascular:      Rate and Rhythm: Normal rate and regular rhythm.      Heart sounds: Normal heart sounds.   Pulmonary:      Effort: Pulmonary effort is normal.      Breath sounds: Normal breath sounds.   Abdominal:      General: Bowel sounds are normal.      Palpations: Abdomen is soft.      Tenderness: There is no abdominal tenderness.   Musculoskeletal:         General: Normal range of motion.      Cervical back: Normal range of motion and neck supple.   Skin:     General: Skin is warm and dry.   Neurological:      Mental Status: She is alert and oriented to person, " place, and time. Mental status is at baseline.   Psychiatric:         Mood and Affect: Mood normal.         Thought Content: Thought content normal.         Judgment: Judgment normal.          Lab on 12/11/2024   Component Date Value Ref Range Status    Hep A IgM 12/11/2024 Non-Reactive  Non-Reactive Final    Hep B C IgM 12/11/2024 Non-Reactive  Non-Reactive Final    Hepatitis C Ab 12/11/2024 Non Reactive  Non Reactive Final    Hepatitis C Quantitation 12/11/2024 HCV Not Detected  IU/mL Final    Test Information 12/11/2024 Comment   Final    The quantitative range of this assay is 15 IU/mL to 100 million IU/mL.    HIV DUO 12/11/2024 Non-Reactive  Non-Reactive Final    Interpretation 12/11/2024 Comment   Final    Not infected with HCV unless early or acute infection is  suspected (which may be delayed in an immunocompromised  individual), or other evidence exists to indicate HCV infection.   Hospital Outpatient Visit on 12/04/2024   Component Date Value Ref Range Status    Heart rate (average) 12/04/2024 81   Final    Heart rate minimum 12/04/2024 60   Final    Heart rate maximum 12/04/2024 159   Final   Lab on 12/04/2024   Component Date Value Ref Range Status    Glucose 12/04/2024 104 (H)  65 - 99 mg/dL Final    BUN 12/04/2024 10  6 - 20 mg/dL Final    Creatinine 12/04/2024 0.91  0.57 - 1.00 mg/dL Final    Sodium 12/04/2024 138  136 - 145 mmol/L Final    Potassium 12/04/2024 4.1  3.5 - 5.2 mmol/L Final    Chloride 12/04/2024 103  98 - 107 mmol/L Final    CO2 12/04/2024 23.4  22.0 - 29.0 mmol/L Final    Calcium 12/04/2024 8.9  8.6 - 10.5 mg/dL Final    Total Protein 12/04/2024 7.0  6.0 - 8.5 g/dL Final    Albumin 12/04/2024 4.0  3.5 - 5.2 g/dL Final    ALT (SGPT) 12/04/2024 222 (H)  1 - 33 U/L Final    AST (SGOT) 12/04/2024 154 (H)  1 - 32 U/L Final    Alkaline Phosphatase 12/04/2024 102  39 - 117 U/L Final    Total Bilirubin 12/04/2024 0.3  0.0 - 1.2 mg/dL Final    Globulin 12/04/2024 3.0  gm/dL Final    A/G Ratio  12/04/2024 1.3  g/dL Final    BUN/Creatinine Ratio 12/04/2024 11.0  7.0 - 25.0 Final    Anion Gap 12/04/2024 11.6  5.0 - 15.0 mmol/L Final    eGFR 12/04/2024 76.5  >60.0 mL/min/1.73 Final    Total Cholesterol 12/04/2024 167  0 - 200 mg/dL Final    Triglycerides 12/04/2024 146  0 - 150 mg/dL Final    HDL Cholesterol 12/04/2024 42  40 - 60 mg/dL Final    LDL Cholesterol  12/04/2024 99  0 - 100 mg/dL Final    VLDL Cholesterol 12/04/2024 26  5 - 40 mg/dL Final    LDL/HDL Ratio 12/04/2024 2.28   Final    Hemoglobin A1C 12/04/2024 5.50  4.80 - 5.60 % Final    Magnesium 12/04/2024 2.0  1.6 - 2.6 mg/dL Final    25 Hydroxy, Vitamin D 12/04/2024 60.4  30.0 - 100.0 ng/ml Final    Rheumatoid Factor Quantitative 12/04/2024 <10.0  0.0 - 14.0 IU/mL Final    REAGAN 12/04/2024 Negative   Final                                         Negative   <1:80                                       Borderline  1:80                                       Positive   >1:80  ICAP nomenclature: AC-0  For more information about Hep-2 cell patterns use  ANApatterns.org, the official website for the International  Consensus on Antinuclear Antibody (REAGAN) Patterns (ICAP).    Please note 12/04/2024 Comment   Final    REAGAN Multiplex methodology was designed to detect up to 11 antibodies  of the 100+ antibodies that may be detected by REAGAN IFA methodology.    CCP Antibodies IgG/IgA 12/04/2024 22 (H)  0 - 19 units Final                              Negative               <20                            Weak positive      20 - 39                            Moderate positive  40 - 59                            Strong positive        >59    Antiscleroderma-70 Antibodies 12/04/2024 <0.2  0.0 - 0.9 AI Final    Sed Rate 12/04/2024 7  0 - 30 mm/hr Final    Sjogren's Anti-SS-A 12/04/2024 <0.2  0.0 - 0.9 AI Final    Sjogren's Anti-SS-B 12/04/2024 <0.2  0.0 - 0.9 AI Final    T3, Free 12/04/2024 3.32  2.00 - 4.40 pg/mL Final    T3, Total 12/04/2024 175.0  80.0 - 200.0 ng/dl  Final    Free T4 12/04/2024 1.00  0.92 - 1.68 ng/dL Final    T4, Total 12/04/2024 8.24  4.50 - 11.70 mcg/dL Final    Thyroglobulin Ab 12/04/2024 <1.0  0.0 - 0.9 IU/mL Final    Thyroglobulin Antibody measured by Mauricio Napavine Methodology  It should be noted that the presence of thyroglobulin antibodies  may not be pathogenic nor diagnostic, especially at very low  levels. The assay  has found that four percent of  individuals without evidence of thyroid disease or autoimmunity  will have positive TgAb levels up to 4 IU/mL.    Thyroid Peroxidase Antibody 12/04/2024 <9  0 - 34 IU/mL Final    TSH 12/04/2024 3.400  0.270 - 4.200 uIU/mL Final    Vitamin B-12 12/04/2024 1,133 (H)  211 - 946 pg/mL Final    DHEA-Sulfate 12/04/2024 34.6 (L)  41.2 - 243.7 ug/dL Final    Estradiol 12/04/2024 1,142.0  pg/mL Final    FSH 12/04/2024 7.54  mIU/mL Final    Progesterone 12/04/2024 0.72  ng/mL Final    Prolactin 12/04/2024 17.40  4.79 - 23.30 ng/mL Final    Iron 12/04/2024 58  37 - 145 mcg/dL Final    Iron Saturation (TSAT) 12/04/2024 11 (L)  20 - 50 % Final    Transferrin 12/04/2024 357  200 - 360 mg/dL Final    TIBC 12/04/2024 532  298 - 536 mcg/dL Final    Cortisol 12/04/2024 9.42    mcg/dL Final    WBC 12/04/2024 6.94  3.40 - 10.80 10*3/mm3 Final    RBC 12/04/2024 4.77  3.77 - 5.28 10*6/mm3 Final    Hemoglobin 12/04/2024 13.5  12.0 - 15.9 g/dL Final    Hematocrit 12/04/2024 40.6  34.0 - 46.6 % Final    MCV 12/04/2024 85.1  79.0 - 97.0 fL Final    MCH 12/04/2024 28.3  26.6 - 33.0 pg Final    MCHC 12/04/2024 33.3  31.5 - 35.7 g/dL Final    RDW 12/04/2024 13.1  12.3 - 15.4 % Final    RDW-SD 12/04/2024 40.5  37.0 - 54.0 fl Final    MPV 12/04/2024 11.0  6.0 - 12.0 fL Final    Platelets 12/04/2024 295  140 - 450 10*3/mm3 Final    Neutrophil % 12/04/2024 61.1  42.7 - 76.0 % Final    Lymphocyte % 12/04/2024 32.7  19.6 - 45.3 % Final    Monocyte % 12/04/2024 5.8  5.0 - 12.0 % Final    Eosinophil % 12/04/2024 0.0 (L)  0.3 -  6.2 % Final    Basophil % 12/04/2024 0.1  0.0 - 1.5 % Final    Immature Grans % 12/04/2024 0.3  0.0 - 0.5 % Final    Neutrophils, Absolute 12/04/2024 4.24  1.70 - 7.00 10*3/mm3 Final    Lymphocytes, Absolute 12/04/2024 2.27  0.70 - 3.10 10*3/mm3 Final    Monocytes, Absolute 12/04/2024 0.40  0.10 - 0.90 10*3/mm3 Final    Eosinophils, Absolute 12/04/2024 0.00  0.00 - 0.40 10*3/mm3 Final    Basophils, Absolute 12/04/2024 0.01  0.00 - 0.20 10*3/mm3 Final    Immature Grans, Absolute 12/04/2024 0.02  0.00 - 0.05 10*3/mm3 Final    nRBC 12/04/2024 0.0  0.0 - 0.2 /100 WBC Final        Result Review :           Assessment and Plan     Diagnoses and all orders for this visit:    1. Morbid obesity with BMI of 40.0-44.9, adult (Primary)  -     Comprehensive Metabolic Panel; Future  -     Hemoglobin A1c; Future  -     Comprehensive Metabolic Panel  -     Hemoglobin A1c    2. Nonalcoholic fatty liver  -     Comprehensive Metabolic Panel; Future  -     Comprehensive Metabolic Panel    3. Gastroesophageal reflux disease without esophagitis  -     Comprehensive Metabolic Panel; Future  -     Comprehensive Metabolic Panel    Other orders  -     Semaglutide-Weight Management 0.5 MG/0.5ML solution auto-injector; Inject 0.5 mL under the skin into the appropriate area as directed 1 (One) Time Per Week for 30 days.  Dispense: 2 mL; Refill: 0     She is fasting today, and labs will be drawn.       ICD-10-CM ICD-9-CM   1. Morbid obesity with BMI of 40.0-44.9, adult  E66.01 278.01    Z68.41 V85.41   2. Nonalcoholic fatty liver  K76.0 571.8   3. Gastroesophageal reflux disease without esophagitis  K21.9 530.81                Patient Education:     Patient is a candidate for the program with a BMI of Body mass index is 42.98 kg/m².. Obesity related health conditions include: BMI is above average. BMI management plan is completed. We reviewed portion control, increasing exercise and pharmacologic options. Details regarding the process of the  program discussed with patient. Patient voiced understanding. Outpatient labs ordered for baseline  Patient had no further questions or concerns.     I explained that obesity and an elevated BMI can be a disease of body weight dysregulation influenced by factors including environment, genetics and hormones and intiated the discussion of semaglutides in appetite dysregulation and metabolic adaptation. We discussed possible side effects and complications of GLP1s including but not limited to nausea, vomiting, abdominal pain, bloating, constipation, diarrhea, slowed digestion, heartburn and gallstones or gallbladder disease. Reviewed that side effects are mild to moderate and dose dependent and typically subside in 4-6 weeks. Will await lab results and if appropriate, refer to [facility] pharmacists.    Health Plan to Include:    Eat small portions  Stop eating before full   Avoid fried/greasy foods  Stay well hydrated        Follow Up   Return in about 4 weeks (around 3/26/2025) for Recheck.  Patient was given instructions and counseling regarding her condition or for health maintenance advice. Please see specific information pulled into the AVS if appropriate.         This document has been electronically signed by PREM Garcia  February 26, 2025 10:07 EST

## (undated) DEVICE — MARKR SKIN W/RULR AND LBL

## (undated) DEVICE — PAD GRND REM POLYHESIVE A/ DISP

## (undated) DEVICE — HOLDER: Brand: DEROYAL

## (undated) DEVICE — SPLINT 1524055 DOYLE II AIRWAY SET: Brand: DOYLE II ™

## (undated) DEVICE — SPNG GZ WOVN 4X4IN 12PLY 10/BX STRL

## (undated) DEVICE — SUT NLY 2/0 664G

## (undated) DEVICE — INTENDED FOR TISSUE SEPARATION, AND OTHER PROCEDURES THAT REQUIRE A SHARP SURGICAL BLADE TO PUNCTURE OR CUT.: Brand: BARD-PARKER ® STAINLESS STEEL BLADES

## (undated) DEVICE — SPLINT 1524050 5PK PAIR DOYLE II AIRWAY: Brand: DOYLE II ™

## (undated) DEVICE — APPL COTN TP PLSTC 6IN STRL LF PK/2

## (undated) DEVICE — PK ENT 70

## (undated) DEVICE — SUT GUT CHRM 4/0 FS2 27IN 635H

## (undated) DEVICE — SPLINT 1522000 20PK PAIR SIMPLESPLINTS

## (undated) DEVICE — SUT GUT PLN FAST ABS 5/0 PC1 18IN 1915G